# Patient Record
Sex: FEMALE | Race: WHITE | Employment: OTHER | URBAN - METROPOLITAN AREA
[De-identification: names, ages, dates, MRNs, and addresses within clinical notes are randomized per-mention and may not be internally consistent; named-entity substitution may affect disease eponyms.]

---

## 2020-01-24 NOTE — H&P (VIEW-ONLY)
[facility] , [address] [facility], [address] [facility] 
[phone] Patient Name:  Shirlene Smith YOB: 1933 Office Visit 1/24/2020 CHIEF COMPLAINT:   
Chief Complaint Patient presents with  Cough  Hemoptysis  Shortness of Breath  COPD HISTORY OF PRESENT ILLNESS:   
I had the pleasure of seeing Ms. Luis Fernando Jaimes in our clinic today. Ms. Alba Henderson is a 80 y.o. female who presents with a history of colon cancer treated with chemo, surgery. Developed pna during chemo and was hospitalized in 2010. Discharged with O2 at that time and followed up in our office. Was on O2 for 3 montths. Restarted using O2 around Christmas (using her 's who has it for COPD). Since December she has had increased chest congestion. Started hemoptysis about 2-3 months ago. Every time she coughs it all blood, mix of old and new. Staying stable over the past 2-3 months. Had CXR at [facility] last Friday. Told has cavitary lesion in the upper part of her right lung. Denies any fever, chills, night sweats. Coughing a lot at night. Lost 8 pounds over last 9 month. No chest pains. No noticeable LAD. Does occasional have wheezing and chest tightness and uses nebulizer with albuterol. Former smoker: quit 1995. 1/2 ppd x 40 years. Cancer: colon cancer stage 2, dx 2009. Disease free since then. Activity limited by knee pain. Can hardly walk due to this. Uses a walker when she does. Past Medical History:  
Diagnosis Date  Arthritis   
 bilat knees  Cancer ([facility] [address] COLON- dx- 5/2009- surg and chemo-- last chemo 1/10  
 COPD   
 3/10 per pt states pulmonary m.d. said it was r/t chemo Problem List  Date Reviewed: 1/24/2020 Codes Class Noted Colon cancer [facility]) ICD-10-CM: C18.9 ICD-9-CM: 153.9 Chronic 2/18/2010  Overview Signed 2/18/2010 12:27 PM by Patti Arias  
 2-18-10  S/P last adjuvant chemotherapy with FOLFOX 2 weeks ago. Pneumonia ICD-10-CM: J18.9 ICD-9-CM: 891 Acute 2010 ILD (interstitial lung disease) (HCC) (Chronic) ICD-10-CM: J84.9 ICD-9-CM: 901  2010 Dyspnea (Chronic) ICD-10-CM: R06.00 
ICD-9-CM: 786.09  2010 Cancer [facility]) ICD-10-CM: C80.1 ICD-9-CM: 199.1  2009 Past Surgical History:  
Procedure Laterality Date  ABDOMEN SURGERY PROC UNLISTED    
 colon resection  HX BREAST LUMPECTOMY    
 RIGHT AND LEFT  
 HX HYSTERECTOMY  HX KNEE ARTHROSCOPY    
 RIGHT  HX VASCULAR ACCESS  VASCULAR SURGERY PROCEDURE UNLIST    
 VARICOSE VEIN STRIPPING No flowsheet data found. Social History Socioeconomic History  Marital status:  Spouse name: Not on file  Number of children: Not on file  Years of education: Not on file  Highest education level: Not on file Occupational History  Not on file Social Needs  Financial resource strain: Not on file  Food insecurity:  
  Worry: Not on file Inability: Not on file  Transportation needs:  
  Medical: Not on file Non-medical: Not on file Tobacco Use  Smoking status: Former Smoker Packs/day: 0.20 Years: 40.00 Pack years: 8.00 Types: Cigarettes Last attempt to quit:  Years since quittin.0  Smokeless tobacco: Never Used Substance and Sexual Activity  Alcohol use: No  
 Drug use: No  
 Sexual activity: Not on file Lifestyle  Physical activity:  
  Days per week: Not on file Minutes per session: Not on file  Stress: Not on file Relationships  Social connections:  
  Talks on phone: Not on file Gets together: Not on file Attends [employer] service: Not on file Active member of club or organization: Not on file Attends meetings of clubs or organizations: Not on file Relationship status: Not on file  Intimate partner violence:  
  Fear of current or ex partner: Not on file Emotionally abused: Not on file Physically abused: Not on file Forced sexual activity: Not on file Other Topics Concern  Not on file Social History Narrative  Not on file Family History Problem Relation Age of Onset  Cancer Father  Cancer Sister Allergies Allergen Reactions  Pcn [Penicillins] Rash, Itching and Swelling  Tetanus Vaccines And Toxoid Unknown (comments) Unknown allergy  Levaquin [Levofloxacin] Itching Itching at site of infusion only at start of infusion, pt states very uncomfortable Current Outpatient Medications Medication Sig  
 albuterol-ipratropium (DUO-NEB) 2.5 mg-0.5 mg/3 ml nebu Take 3 mL by inhalation every six (6) hours as needed for Wheezing.  Oxygen 2.5-3 lpm cont  umeclidinium-vilanterol (ANORO ELLIPTA) 62.5-25 mcg/actuation inhaler Take 1 Puff by inhalation daily.  HYDROCODONE BIT/ACETAMINOPHEN (LORTAB 10/500 PO) take  by mouth as needed.  CALCIUM CARBONATE/VITAMIN D3 (CALTRATE-600 PLUS VITAMIN D3 PO) Take  by mouth two (2) times a day.  FERROUS SULFATE PO Take 5 g by mouth nightly.  naproxen (NAPROSYN) 500 mg tablet Take 500 mg by mouth as needed. Stopped 8/19/10  promethazine (PHENERGAN) 25 mg tablet Take 25 mg by mouth every six (6) hours as needed for Nausea.  ACETAMINOPHEN/DP-HYDRAM HCL (TYLENOL PM EXTRA STRENGTH PO) take 2 Tabs by mouth nightly. No current facility-administered medications for this visit. REVIEW OF SYSTEMS: 
 
Review of Systems Constitutional: Negative for chills, diaphoresis, fever, malaise/fatigue and weight loss. HENT: Positive for congestion. Negative for ear discharge, ear pain, hearing loss, nosebleeds, sinus pain, sore throat and tinnitus. Eyes: Negative for blurred vision, pain and redness.   
Respiratory: Positive for cough, hemoptysis, shortness of breath and wheezing. Negative for sputum production. Cardiovascular: Negative for chest pain, palpitations, orthopnea, leg swelling and PND. Gastrointestinal: Negative for abdominal pain, blood in stool, constipation, diarrhea, heartburn, melena, nausea and vomiting. Genitourinary: Negative for dysuria, frequency, hematuria and urgency. Musculoskeletal: Negative for back pain, joint pain, myalgias and neck pain. Skin: Negative for itching and rash. Neurological: Negative for dizziness, tremors, focal weakness, seizures and headaches. Endo/Heme/Allergies: Negative for environmental allergies. Does not bruise/bleed easily. Psychiatric/Behavioral: Negative for depression, hallucinations, memory loss and suicidal ideas. The patient is not nervous/anxious. PHYSICAL EXAM: 
Visit Vitals /80 (BP 1 Location: Left arm, BP Patient Position: Sitting) Pulse 99 Temp 98 °F (36.7 °C) (Temporal) Resp 20 Ht 5' 7\" (1.702 m) Wt 213 lb (96.6 kg) SpO2 97% BMI 33.36 kg/m² GENERAL APPEARANCE: 
The patient is over weight and in no respiratory distress. HEENT: 
PERRL. Conjunctivae unremarkable. Nasal mucosa is without epistaxis, exudate, or polyps. Gums and dentition are unremarkable. There is no oropharyngeal narrowing. NECK/LYMPHATIC: 
Symmetrical with no elevation of jugular venous pulsation. Trachea midline. No thyroid enlargement. No cervical adenopathy. LUNGS: 
Normal respiratory effort with symmetrical lung expansion. Breath sounds right-sided inspiratory rhonchi. HEART: 
There is a regular rate and rhythm. No murmur, rub, or gallop. ABDOMEN: 
Soft and non-tender. No hepatosplenomegaly. Bowel sounds are normal.   
NEURO/PSYCH: 
The patient is alert and oriented to person, place, and time. Memory appears intact and mood is normal.  No gross sensorimotor deficits are present. MS/SKIN: 
There is no edema in the lower extremities. No rashes, bruises, lesions, ulcers visible. DIAGNOSTIC TESTS: 
CT of chest:   
Results from Hospital Encounter encounter on 11/15/10 CT CHEST WITH CONTRAST Results for orders placed during the hospital encounter of 04/06/10 CT CHEST WITHOUT CONTRAST Narrative [address] [facility] COMPUTED TOMOGRAPHY              
 
NAME: Marcela Fiore                                                              
PT : 1933               SEX: F         MR#: [id number] LOCATION/NS: CT-                 AGE: 19P      ACCT: [de-identified] ORDERING: MARKO Tomlinson                     PT TYPE: Munir Flood ([id number]) Final Report ICD Codes / Adm. Diagnosis:    /    
Examination:  CT CHEST WO CONTRAST  - [id number] - 2010  1:06PM 
 
CT CHEST WITHOUT CONTRAST 04/06/10 Reason:  Interstitial lung disease. REPORT:   
 
TECHNIQUE:  Multiple contiguous helical CT images were obtained from the  
base of the neck through the mid abdomen without the administration of  
intravenous contrast.  Subsequent noncontiguous high-resolution,  
thin-section axial images were obtained with the patient in the prone and  
supine position. imaging was performed according to the institutional  
high-resolution CT protocol. COMPARISON:  CT of the chest with contrast 02/16/10. FINDINGS:  The base of the neck is unremarkable in appearance. A port is  
seen in the right upper anterior chest wall soft tissues. A single catheter  
is seen extending from this with the tip at the cavoatrial junction. No  
supraclavicular, axillary, or mediastinal lymphadenopathy is seen. No  
definite hilar lymphadenopathy is seen although evaluation is limited due to  
noncontrast technique. The thoracic aorta demonstrates moderate atherosclerotic calcification although remains normal in caliber. There are  
centrilobular emphysematous changes seen in the upper lobes predominantly. There are coarse reticular opacities seen peripherally in the lung bases. These do not improve with prone positioning and therefore are consistent  
with relatively mild fibrotic changes at these levels. In addition, there  
is mild cylindrical bronchiectatic changes seen in the lower lobes. No  
abnormal ground-glass opacities are seen. No aggressive lytic, or sclerotic  
osseous abnormality is seen. Limited imaging of the upper abdomen shows no  
significant abnormalities. There is an 11 mm rounded soft tissue density  
seen adjacent to the spleen likely representing a small splenule. IMPRESSION:  RELATIVELY MILD FIBROTIC CHANGES, AND CYLINDRICAL BRONCHIECTATIC CHANGES SEEN IN THE LOWER LOBES. OTHERWISE, CENTRILOBULAR  
EMPHYSEMATOUS CHANGES ARE MOST PROMINENTLY SEEN INVOLVING THE UPPER LOBES. Interpreting/Reading Doctor: Christ Dior ([id number]) Transcribed: TLS on 2010 Delia Victor ([id number])  2010 Distribution:  Attending Doctor: Tatyana Cervantes Results for orders placed during the hospital encounter of 11/15/10 CT CHEST WITH CONTRAST Narrative [address] [facility] COMPUTED TOMOGRAPHY              
 
NAME: Linn Leyden                                                              
PT : 1933               SEX: F         MR#: [id number] LOCATION/NS: CT-                 AGE: 59K      ACCT: [de-identified] ORDERING: MARKO Cervantes                     PT TYPE: Yelena Victor ([id number]) Final Report ICD Codes / Adm. Diagnosis:    /    
 Examination:  CT CHEST W CONTRAST  - [id number] - Nov 15 2010 12:37PM 
 
Reason:  adenopathy REPORT: 
CT CHEST WITH CONTRAST DATED 11/15/2010. History: Followup adenopathy. Comparison: CT chest without contrast 4/6/2010, and 2/16/2010. Technique:   Multiple contiguous helical CT images reconstructed at 5 mm  
were obtained from the neck base to the mid abdomen following the  
administration of 100 cc of intravenous contrast without acute complication. Findings: 
The venous port present on the prior study has been removed. The base of the  
neck is unremarkable in appearance. No axillary, mediastinal, or hilar  
lymphadenopathy is seen. The thoracic aorta is normal in caliber. No  
obvious filling defects are seen in the central pulmonary arteries, although  
these are not well evaluated due to non-angiographic technique. Moderate  
atherosclerotic calcification is seen of the thoracic aorta and coronary  
arteries. Evaluation with lung windows demonstrates a 4 mm noncalcified nodule seen in  
the right upper lobe on image 12. In retrospect, this was present and stable  
when compared to the prior study. The previous dependent atelectatic changes  
are improved. No  pleural effusion is seen. Lungs are expanded without  
evidence for pneumothorax. Limited evaluation of the upper abdomen demonstrates no significant  
abnormality. The spleen is heterogeneous in its appearance likely related to  
the early arterial phase of imaging. IMPRESSION:   
1. No axillary, mediastinal, or hilar lymphadenopathy. The lymphadenopathy  
noted on 2/16/2010 study was likely benign and reactive to pulmonary  
findings seen at that time. 2. 4 mm noncalcified nodule seen in the right upper lobe which is stable  
dating back to the 2/16/2010 study. Recommend a followup noncontrast CT scan  
of the chest in 6 months to ensure stability of this finding. Interpreting/Reading Doctor: Giancarlo Adames ([id number]) Transcribed: n/a on 11/15/2010 Manuel Shepard ([id number])  11/15/2010 Distribution:  Attending Doctor: Mary West. Ana Luisa Wright No results found for this or any previous visit. CXR:  01/17/2020 Only received lateral images Cardiomediastinal contours within normal limits.  Atherosclerosis of the aortic arch. There is a cavitary mass in the right upper lobe.  Mild streaky opacities in the lung bases which may represent atelectasis. No pleural effusion or pneumothorax. Multilevel degenerative changes of the spine. Results for orders placed during the hospital encounter of 04/01/10 XR CHEST PA AND LATERAL  
 
 
 
PET/CT:  
No results found for this or any previous visit. No results found for this or any previous visit. Exercise oximetry:   
1/24/2020 Resting RA Sat - 96% Lowest ambulating RA Sat - 95% Spirometry:  
Date:    1/24/20 FVC    1.73-67 FEV1    1.13-67 FEV1/FVC    65 FEF 25-75%    0.62-57 Bronchodilator Response TLC           
RV           
DLCO Echo: 
No results found for this or any previous visit. ASSESSMENT:  (Medical Decision Making) ICD-10-CM ICD-9-CM 1. Cavitary lesion of lung J98.4 518.89 CT CHEST WO CONT 2. SOB (shortness of breath) R06.02 786.05   
3. AVALOS (dyspnea on exertion) R06.09 786.09 AMB POC PULSE OXIMETRY, MULTIPLE  
   AMB POC SPIROMETRY 4. Hemoptysis R04.2 786.30   
5. Pulmonary fibrosis ([facility] [address] J84.10 515 Patient with history of former tobacco abuse and resultant moderate obstruction on spirometry reporting several months of hemoptysis. Outside chest x-ray report mentions right upper lobe cavitary mass. No TB risk factors. Concerning for malignancy. Did not desaturate on today's spirometry. PLAN: 
-will schedule CT scan to evaluate cavitary lesion further. May need bronchoscopy. -will start anoro for her COPD. -does not qualify for her own supplemental O2.  
-asking for something for cough/to help her sleep at night. Tried tessalon without help. Tried phenergan and worked well. Told her this would not be indicated for cough. Can try OTC benadryl for sleep. She has done this with incomplete relief. Will give 5 days of hycodan. I have reviewed the patients controlled substance prescription history, as maintained in the [address] [place] [address] prescription monitoring program, so that the prescription(s) for a  controlled substance can be given. For now plan to f/u in 3 months. Portions of this note were created using voice recognition software. As such, error of speech recognition may have occurred. Orders Placed This Encounter  AMB POC SPIROMETRY  CT CHEST WO CONT Standing Status:   Future Standing Expiration Date:   2/24/2021 Order Specific Question:   Reason for Exam  
  Answer:   Super D Protocol  AMB POC PULSE OXIMETRY, MULTIPLE See result in progress note  umeclidinium-vilanterol (ANORO ELLIPTA) 62.5-25 mcg/actuation inhaler Sig: Take 1 Puff by inhalation daily. Dispense:  1 Inhaler Refill:  11 Gerard Benoit MD 
Electronically signed

## 2020-01-25 ENCOUNTER — HOSPITAL ENCOUNTER (OUTPATIENT)
Dept: CT IMAGING | Age: 85
Discharge: HOME | End: 2020-01-25
Attending: INTERNAL MEDICINE
Payer: MEDICARE

## 2020-01-25 DIAGNOSIS — J98.4 CAVITARY LESION OF LUNG: ICD-10-CM

## 2020-01-25 PROCEDURE — 71250 CT THORAX DX C-: CPT

## 2020-02-18 ENCOUNTER — ANESTHESIA EVENT (OUTPATIENT)
Dept: ENDOSCOPY | Age: 85
End: 2020-02-18
Payer: MEDICARE

## 2020-02-18 ENCOUNTER — HOSPITAL ENCOUNTER (OUTPATIENT)
Dept: PET IMAGING | Age: 85
Discharge: HOME | End: 2020-02-18
Payer: MEDICARE

## 2020-02-18 DIAGNOSIS — R91.1 PULMONARY NODULE: ICD-10-CM

## 2020-02-18 DIAGNOSIS — J98.4 CAVITATING MASS IN UPPER LOBE OF LUNG: ICD-10-CM

## 2020-02-18 PROCEDURE — 74011636320 HC RX REV CODE- 636/320: Performed by: INTERNAL MEDICINE

## 2020-02-18 PROCEDURE — A9552 F18 FDG: HCPCS

## 2020-02-18 RX ORDER — SODIUM CHLORIDE 0.9 % (FLUSH) 0.9 %
10 SYRINGE (ML) INJECTION
Status: COMPLETED | OUTPATIENT
Start: 2020-02-18 | End: 2020-02-18

## 2020-02-18 RX ADMIN — Medication 10 ML: at 12:38

## 2020-02-18 RX ADMIN — DIATRIZOATE MEGLUMINE AND DIATRIZOATE SODIUM 10 ML: 660; 100 LIQUID ORAL; RECTAL at 12:38

## 2020-02-19 ENCOUNTER — APPOINTMENT (OUTPATIENT)
Dept: GENERAL RADIOLOGY | Age: 85
End: 2020-02-19
Attending: INTERNAL MEDICINE
Payer: MEDICARE

## 2020-02-19 ENCOUNTER — HOSPITAL ENCOUNTER (OUTPATIENT)
Age: 85
Setting detail: OUTPATIENT SURGERY
Discharge: HOME | End: 2020-02-19
Attending: INTERNAL MEDICINE | Admitting: INTERNAL MEDICINE
Payer: MEDICARE

## 2020-02-19 ENCOUNTER — ANESTHESIA (OUTPATIENT)
Dept: ENDOSCOPY | Age: 85
End: 2020-02-19
Payer: MEDICARE

## 2020-02-19 VITALS
TEMPERATURE: 98.2 F | RESPIRATION RATE: 18 BRPM | OXYGEN SATURATION: 97 % | HEART RATE: 81 BPM | WEIGHT: 213 LBS | DIASTOLIC BLOOD PRESSURE: 76 MMHG | SYSTOLIC BLOOD PRESSURE: 150 MMHG | BODY MASS INDEX: 33.43 KG/M2 | HEIGHT: 67 IN

## 2020-02-19 DIAGNOSIS — C34.81 MALIGNANT NEOPLASM OF OVERLAPPING SITES OF RIGHT LUNG (HCC): ICD-10-CM

## 2020-02-19 PROCEDURE — 88173 CYTOPATH EVAL FNA REPORT: CPT

## 2020-02-19 PROCEDURE — 77030012699 HC VLV SUC CNTRL OCOA -A: Performed by: INTERNAL MEDICINE

## 2020-02-19 PROCEDURE — 31652 BRONCH EBUS SAMPLNG 1/2 NODE: CPT | Performed by: INTERNAL MEDICINE

## 2020-02-19 PROCEDURE — 77030039425 HC BLD LARYNG TRULITE DISP TELE -A: Performed by: ANESTHESIOLOGY

## 2020-02-19 PROCEDURE — 74011000250 HC RX REV CODE- 250: Performed by: REGISTERED NURSE

## 2020-02-19 PROCEDURE — 88112 CYTOPATH CELL ENHANCE TECH: CPT

## 2020-02-19 PROCEDURE — 76060000033 HC ANESTHESIA 1 TO 1.5 HR: Performed by: INTERNAL MEDICINE

## 2020-02-19 PROCEDURE — 88305 TISSUE EXAM BY PATHOLOGIST: CPT

## 2020-02-19 PROCEDURE — 77030037088 HC TUBE ENDOTRACH ORAL NSL COVD-A: Performed by: ANESTHESIOLOGY

## 2020-02-19 PROCEDURE — 31654 BRONCH EBUS IVNTJ PERPH LES: CPT | Performed by: INTERNAL MEDICINE

## 2020-02-19 PROCEDURE — 31628 BRONCHOSCOPY/LUNG BX EACH: CPT | Performed by: INTERNAL MEDICINE

## 2020-02-19 PROCEDURE — 77030028571 HC CATH ENDOBRNCH DISP BIOP KT SPMD -G1: Performed by: INTERNAL MEDICINE

## 2020-02-19 PROCEDURE — 77030003406 HC NDL ASPIR BIOP OCOA -C: Performed by: INTERNAL MEDICINE

## 2020-02-19 PROCEDURE — 88172 CYTP DX EVAL FNA 1ST EA SITE: CPT

## 2020-02-19 PROCEDURE — 88341 IMHCHEM/IMCYTCHM EA ADD ANTB: CPT

## 2020-02-19 PROCEDURE — 74011250636 HC RX REV CODE- 250/636: Performed by: REGISTERED NURSE

## 2020-02-19 PROCEDURE — 88177 CYTP FNA EVAL EA ADDL: CPT

## 2020-02-19 PROCEDURE — 31632 BRONCHOSCOPY/LUNG BX ADDL: CPT | Performed by: INTERNAL MEDICINE

## 2020-02-19 PROCEDURE — 77030009046 HC CATH BRNCH BLLN OCOA -B: Performed by: INTERNAL MEDICINE

## 2020-02-19 PROCEDURE — 76040000027: Performed by: INTERNAL MEDICINE

## 2020-02-19 PROCEDURE — 77030031420 HC NDL TIP BRSH ASP SD SPDM -B: Performed by: INTERNAL MEDICINE

## 2020-02-19 PROCEDURE — 88342 IMHCHEM/IMCYTCHM 1ST ANTB: CPT

## 2020-02-19 PROCEDURE — 31627 NAVIGATIONAL BRONCHOSCOPY: CPT | Performed by: INTERNAL MEDICINE

## 2020-02-19 PROCEDURE — 77030021922 HC FCPS BIOP SD DISP SPDM -D: Performed by: INTERNAL MEDICINE

## 2020-02-19 PROCEDURE — 74011250636 HC RX REV CODE- 250/636: Performed by: ANESTHESIOLOGY

## 2020-02-19 PROCEDURE — 77030031404 HC PTCH SENS SD DISP SPDM -A: Performed by: INTERNAL MEDICINE

## 2020-02-19 RX ORDER — SUCCINYLCHOLINE CHLORIDE 20 MG/ML
INJECTION INTRAMUSCULAR; INTRAVENOUS AS NEEDED
Status: DISCONTINUED | OUTPATIENT
Start: 2020-02-19 | End: 2020-02-19 | Stop reason: HOSPADM

## 2020-02-19 RX ORDER — SODIUM CHLORIDE 0.9 % (FLUSH) 0.9 %
5-40 SYRINGE (ML) INJECTION AS NEEDED
Status: DISCONTINUED | OUTPATIENT
Start: 2020-02-19 | End: 2020-02-19 | Stop reason: HOSPADM

## 2020-02-19 RX ORDER — LIDOCAINE HYDROCHLORIDE 20 MG/ML
INJECTION, SOLUTION EPIDURAL; INFILTRATION; INTRACAUDAL; PERINEURAL AS NEEDED
Status: DISCONTINUED | OUTPATIENT
Start: 2020-02-19 | End: 2020-02-19 | Stop reason: HOSPADM

## 2020-02-19 RX ORDER — SODIUM CHLORIDE 0.9 % (FLUSH) 0.9 %
5-40 SYRINGE (ML) INJECTION EVERY 8 HOURS
Status: DISCONTINUED | OUTPATIENT
Start: 2020-02-19 | End: 2020-02-19 | Stop reason: HOSPADM

## 2020-02-19 RX ORDER — HYDROMORPHONE HYDROCHLORIDE 1 MG/ML
0.5 INJECTION, SOLUTION INTRAMUSCULAR; INTRAVENOUS; SUBCUTANEOUS
Status: DISCONTINUED | OUTPATIENT
Start: 2020-02-19 | End: 2020-02-19 | Stop reason: HOSPADM

## 2020-02-19 RX ORDER — SODIUM CHLORIDE 9 MG/ML
25 INJECTION, SOLUTION INTRAVENOUS CONTINUOUS
Status: DISCONTINUED | OUTPATIENT
Start: 2020-02-19 | End: 2020-02-19 | Stop reason: HOSPADM

## 2020-02-19 RX ORDER — ONDANSETRON 2 MG/ML
INJECTION INTRAMUSCULAR; INTRAVENOUS AS NEEDED
Status: DISCONTINUED | OUTPATIENT
Start: 2020-02-19 | End: 2020-02-19 | Stop reason: HOSPADM

## 2020-02-19 RX ORDER — SODIUM CHLORIDE, SODIUM LACTATE, POTASSIUM CHLORIDE, CALCIUM CHLORIDE 600; 310; 30; 20 MG/100ML; MG/100ML; MG/100ML; MG/100ML
100 INJECTION, SOLUTION INTRAVENOUS CONTINUOUS
Status: DISCONTINUED | OUTPATIENT
Start: 2020-02-19 | End: 2020-02-19 | Stop reason: HOSPADM

## 2020-02-19 RX ORDER — ROCURONIUM BROMIDE 10 MG/ML
INJECTION, SOLUTION INTRAVENOUS AS NEEDED
Status: DISCONTINUED | OUTPATIENT
Start: 2020-02-19 | End: 2020-02-19 | Stop reason: HOSPADM

## 2020-02-19 RX ORDER — FENTANYL CITRATE 50 UG/ML
INJECTION, SOLUTION INTRAMUSCULAR; INTRAVENOUS AS NEEDED
Status: DISCONTINUED | OUTPATIENT
Start: 2020-02-19 | End: 2020-02-19 | Stop reason: HOSPADM

## 2020-02-19 RX ORDER — OXYCODONE HYDROCHLORIDE 5 MG/1
5 TABLET ORAL
Status: DISCONTINUED | OUTPATIENT
Start: 2020-02-19 | End: 2020-02-19 | Stop reason: HOSPADM

## 2020-02-19 RX ORDER — HYDROCODONE BITARTRATE AND ACETAMINOPHEN 7.5; 325 MG/1; MG/1
1 TABLET ORAL AS NEEDED
Status: DISCONTINUED | OUTPATIENT
Start: 2020-02-19 | End: 2020-02-19 | Stop reason: HOSPADM

## 2020-02-19 RX ORDER — FENTANYL CITRATE 50 UG/ML
100 INJECTION, SOLUTION INTRAMUSCULAR; INTRAVENOUS ONCE
Status: DISCONTINUED | OUTPATIENT
Start: 2020-02-19 | End: 2020-02-19 | Stop reason: HOSPADM

## 2020-02-19 RX ORDER — MIDAZOLAM HYDROCHLORIDE 1 MG/ML
2 INJECTION, SOLUTION INTRAMUSCULAR; INTRAVENOUS
Status: DISCONTINUED | OUTPATIENT
Start: 2020-02-19 | End: 2020-02-19 | Stop reason: HOSPADM

## 2020-02-19 RX ORDER — DEXAMETHASONE SODIUM PHOSPHATE 4 MG/ML
INJECTION, SOLUTION INTRA-ARTICULAR; INTRALESIONAL; INTRAMUSCULAR; INTRAVENOUS; SOFT TISSUE AS NEEDED
Status: DISCONTINUED | OUTPATIENT
Start: 2020-02-19 | End: 2020-02-19 | Stop reason: HOSPADM

## 2020-02-19 RX ORDER — LIDOCAINE HYDROCHLORIDE 10 MG/ML
0.1 INJECTION INFILTRATION; PERINEURAL AS NEEDED
Status: DISCONTINUED | OUTPATIENT
Start: 2020-02-19 | End: 2020-02-19 | Stop reason: HOSPADM

## 2020-02-19 RX ORDER — PROPOFOL 10 MG/ML
INJECTION, EMULSION INTRAVENOUS AS NEEDED
Status: DISCONTINUED | OUTPATIENT
Start: 2020-02-19 | End: 2020-02-19 | Stop reason: HOSPADM

## 2020-02-19 RX ORDER — FAMOTIDINE 20 MG/1
20 TABLET, FILM COATED ORAL ONCE
Status: DISCONTINUED | OUTPATIENT
Start: 2020-02-19 | End: 2020-02-19 | Stop reason: HOSPADM

## 2020-02-19 RX ORDER — NALOXONE HYDROCHLORIDE 0.4 MG/ML
0.1 INJECTION, SOLUTION INTRAMUSCULAR; INTRAVENOUS; SUBCUTANEOUS AS NEEDED
Status: DISCONTINUED | OUTPATIENT
Start: 2020-02-19 | End: 2020-02-19 | Stop reason: HOSPADM

## 2020-02-19 RX ADMIN — FENTANYL CITRATE 25 MCG: 50 INJECTION INTRAMUSCULAR; INTRAVENOUS at 10:39

## 2020-02-19 RX ADMIN — ROCURONIUM BROMIDE 5 MG: 10 INJECTION, SOLUTION INTRAVENOUS at 10:02

## 2020-02-19 RX ADMIN — FENTANYL CITRATE 25 MCG: 50 INJECTION INTRAMUSCULAR; INTRAVENOUS at 10:16

## 2020-02-19 RX ADMIN — ONDANSETRON 4 MG: 2 INJECTION INTRAMUSCULAR; INTRAVENOUS at 10:17

## 2020-02-19 RX ADMIN — ROCURONIUM BROMIDE 25 MG: 10 INJECTION, SOLUTION INTRAVENOUS at 10:13

## 2020-02-19 RX ADMIN — FENTANYL CITRATE 25 MCG: 50 INJECTION INTRAMUSCULAR; INTRAVENOUS at 10:47

## 2020-02-19 RX ADMIN — SUCCINYLCHOLINE CHLORIDE 120 MG: 20 INJECTION, SOLUTION INTRAMUSCULAR; INTRAVENOUS at 10:02

## 2020-02-19 RX ADMIN — SODIUM CHLORIDE, SODIUM LACTATE, POTASSIUM CHLORIDE, AND CALCIUM CHLORIDE 100 ML/HR: 600; 310; 30; 20 INJECTION, SOLUTION INTRAVENOUS at 09:20

## 2020-02-19 RX ADMIN — LIDOCAINE HYDROCHLORIDE 60 MG: 20 INJECTION, SOLUTION EPIDURAL; INFILTRATION; INTRACAUDAL; PERINEURAL at 10:02

## 2020-02-19 RX ADMIN — DEXAMETHASONE SODIUM PHOSPHATE 10 MG: 4 INJECTION, SOLUTION INTRAMUSCULAR; INTRAVENOUS at 10:17

## 2020-02-19 RX ADMIN — FENTANYL CITRATE 25 MCG: 50 INJECTION INTRAMUSCULAR; INTRAVENOUS at 10:59

## 2020-02-19 RX ADMIN — PROPOFOL 200 MG: 10 INJECTION, EMULSION INTRAVENOUS at 10:02

## 2020-02-19 NOTE — DISCHARGE INSTRUCTIONS
RESPIRATORY CARE - BRONCHOSCOPY - DISCHARGE INSTRUCTIONS    You received a lot of numbing medication for your throat and nose, and you also received medication to make you sleepy during your procedure. Because of this and because the bronchoscopy may have irritated your airways, we ask that you follow these directions:    1. Do not eat or drink until  1200pm .   After that, you may have what you please. You may want to try some liquids first, because your throat may be a little sore. 2. Medication may cause drowsiness for several hours, therefore:  · Do not drive or operate machinery for remainder of the day. · No alcohol today  · Do not make any important or legal decisions for 24 hours  · Do not sign any legal documents for 24 hours    3. You may cough up more mucus than usual and you may see some blood, but this is expected and should subside by the following day. 4. If severe throat irritation, coughing, or bleeding continue, call your doctor. 5.         If you run a fever greater than 102, call [facility] at [phone]. 6.         Dr. Andrea Sanz has asked that you:                A. Call the doctor's office for any questions or concerns regarding today's procedure. B. The office will call you to schedule follow up appointments and additional procedures/tests needed. Discharge Medications:  Resume all normal medications taking caution with pain and sedation medications.           Instructions given to Airam Snow and other family members    After general anesthesia or intravenous sedation, for 24 hours or while taking prescription Narcotics:  · Limit your activities  · A responsible adult needs to be with you for the next 24 hours  · Do not drive and operate hazardous machinery  · Do not make important personal or business decisions  · Do  not drink alcoholic beverages  · If you have not urinated within 8 hours after discharge, please contact your surgeon on call. · If you have sleep apnea and have a CPAP machine, please use it for all naps and sleeping. · Please use caution when taking narcotics and any of your home medications that may cause drowsiness. *  Please give a list of your current medications to your Primary Care Provider. *  Please update this list whenever your medications are discontinued, doses are      changed, or new medications (including over-the-counter products) are added. *  Please carry medication information at all times in case of emergency situations. These are general instructions for a healthy lifestyle:  No smoking/ No tobacco products/ Avoid exposure to second hand smoke  Surgeon General's Warning:  Quitting smoking now greatly reduces serious risk to your health. Obesity, smoking, and sedentary lifestyle greatly increases your risk for illness  A healthy diet, regular physical exercise & weight monitoring are important for maintaining a healthy lifestyle    You may be retaining fluid if you have a history of heart failure or if you experience any of the following symptoms:  Weight gain of 3 pounds or more overnight or 5 pounds in a week, increased swelling in our hands or feet or shortness of breath while lying flat in bed. Please call your doctor as soon as you notice any of these symptoms; do not wait until your next office visit.

## 2020-02-19 NOTE — INTERVAL H&P NOTE
H&P Update:  Arti Mckinnon was seen and examined. History and physical has been reviewed. The patient has been examined.  There have been no significant clinical changes since the completion of the originally dated History and Physical.

## 2020-02-19 NOTE — PROCEDURES
PROCEDURE  Bronchoscopy with Endobronchial Ultrasound Guided Fine Needle Aspiration Of Medistinal Lymph nodes and airway inspection and EMN aided biopsy of peripheral lung nodule. EQUIPMENT:  Olympus T180 bronchoscope, Super Dimension EMN system, 180 deg steareble sheath, Olympus [id number] EBUS scope, UM 17 Radial probe, Super D forceps, Fluoroscopy. INDICATION   Peripheral nodule/mass suspicious for malignancy/staging of presumed malignancy        POST OP DIAGNOSIS:  Super Dimension EMN system was employed to identify and biopsy the RUL mass and RLL nodule mass seen on CT/PET. 8 forceps biopsies for upper lobe mass and 8 forceps biopsies fron RLL nodule were obtained and evaluated via touch prep and MADI. Touch preps revealed malignancy(NSC) on both targets. Histology from obtained tissue is currently pending. 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 This could obviously also be a T4 RUL(IIIA) cancer with metastases to RLL. PET appearance suggests 2 different behaviours with RUL cavitating and very pet positive and RLL solid and low pet activity- await final path    Needs liver protocol CT  To exclude liver metastasis based on PET report. Will obtain brain MRI and CPFT and discuss in [facility] once all test results are available. ANESTHESIA  GETA    AIRWAY INSPECTION  After obtaining informed consent, using a bite block, an Olympus Q 180 viedeo bronchoscope was  introduced into the trachea through the vocal cords without complication.     RIGHT  LOCATION NORM/ABNORMAL DESCRIPTION   VOCAL CORDS NL    TRACHEA NL    MARIE NL    RMSB NL    RUL NL    BI NL    RML NL    SUP SEGM RLL NL    MED BASAL NL    ANTERIOR BASAL NL    LATERAL BASAL NL    POSTERIOR BASAL NL                  LEFT  LOCATION NORMAL/ABNORMAL TYPE   LMSB NL    JOSEPH NL    LINGULA NL    SUPERIOR DIVISION NL    SUPERIOR SEG LLL NL ANANT-MEDIAL LLL NL    LATERAL LLL NL    POSTERIOR LLL NL                         EBUS  After completing the airway inspection an Olympus  F EBUS bronchoscope was introduced into the trachea through the vocal chords without complication. The balloon was inflated with saline and a mediastinal inspection commenced:      STATION SIZE IN CM   11R sup No tgt   11R inf No tgt   10R No tgt   7 No tgt   4R 1.0/0.9   11L No tgt   10L No tgt   4L No tgt   2L No tgt on PET   2R \"         After identifying targets the following samples were obtained:    STATION PASS# LYMPHOCYTES MALIGNANT ATYPICAL GRANULOMA NONDGNSTC   4R 1 + - - -     2 + - - -     3 + - - -        © 2018 [employer] and/or its affiliates. All Rights Reserved. T, N, and M descriptors for the eighth edition of TNM classification for lung cancer    T: Primary tumor   Tx Primary tumor cannot be assessed or tumor proven by presence of malignant cells in sputum or bronchial washings but not visualized by imaging or bronchoscopy   T0 No evidence of primary tumor   Tis Carcinoma in situ   T1 Tumor ? 3 cm in greatest dimension surrounded by lung or visceral pleura without bronchoscopic evidence of invasion more proximal than the lobar bronchus (ie, not in the main bronchus)*   T1a(mi) Minimally invasive adenocarcinoma¶   T1a Tumor ? 1 cm in greatest dimension*   T1b Tumor >1 cm but ?2 cm in greatest dimension*   T1c Tumor >2 cm but ?3 cm in greatest dimension*   T2 Tumor >3 cm but ?5 cm or tumor with any of the following features:?  · Involves main bronchus regardless of distance from the cherie but without involvement of the cherie  · Invades visceral pleura  · Associated with atelectasis or obstructive pneumonitis that extends to the hilar region, involving part or all of the lung   T2a Tumor >3 cm but ?4 cm in greatest dimension   T2b Tumor >4 cm but ?5 cm in greatest dimension   T3 Tumor >5 cm but ?7 cm in greatest dimension or associated with separate tumor nodule(s) in the same lobe as the primary tumor or directly invades any of the following structures: chest wall (including the parietal pleura and superior sulcus tumors), phrenic nerve, parietal pericardium   T4 Tumor >7 cm in greatest dimension or associated with separate tumor nodule(s) in a different ipsilateral lobe than that of the primary tumor or invades any of the following structures: diaphragm, mediastinum, heart, great vessels, trachea, recurrent laryngeal nerve, esophagus, vertebral body, and cherie   N: Regional lymph node involvement   Nx Regional lymph nodes cannot be assessed   N0 No regional lymph node metastasis   N1 Metastasis in ipsilateral peribronchial and/or ipsilateral hilar lymph nodes and intrapulmonary nodes, including involvement by direct extension   N2 Metastasis in ipsilateral mediastinal and/or subcarinal lymph node(s)   N3 Metastasis in contralateral mediastinal, contralateral hilar, ipsilateral or contralateral scalene, or supraclavicular lymph node(s)   M: Distant metastasis   M0 No distant metastasis   M1 Distant metastasis present   M1a Separate tumor nodule(s) in a contralateral lobe; tumor with pleural or pericardial nodule(s) or malignant pleural or pericardial effusion? M1b Single extrathoracic metastasis§   M1c Multiple extrathoracic metastases in one or more organs   Stage groupings   Occult carcinoma TX N0 M0   Stage 0 Tis N0 M0   Stage IA1 T1a(mi) N0 M0    T1a N0 M0   Stage IA2 T1b N0 M0   Stage IA3 T1c N0 M0   Stage IB T2a N0 M0   Stage IIA T2b N0 M0   Stage IIB T1a to c N1 M0    T2a N1 M0    T2b N1 M0    T3 N0 M0   Stage IIIA T1a to c N2 M0    T2a to b N2 M0    T3 N1 M0    T4 N0 M0    T4 N1 M0   Stage IIIB T1a to c N3 M0    T2a to b N3 M0    T3 N2 M0    T4 N2 M0   Stage IIIC T3 N3 M0    T4 N3 M0   Stage KRUNAL Any T Any N M1a    Any T Any N M1b   Stage IVB Any T Any N M1c   NOTE: Changes to the seventh edition are in bold.     TNM: tumor, node, metastasis; Tis: carcinoma in situ; T1a(mi): minimally invasive adenocarcinoma. * The uncommon superficial spreading tumor of any size with its invasive component limited to the bronchial wall, which may extend proximal to the main bronchus, is also classified as T1a. ¶ Solitary adenocarcinoma, ?3 cm with a predominately lepidic pattern and ?5 mm invasion in any one focus. ? T2 tumors with these features are classified as T2a if ?4 cm in greatest dimension or if size cannot be determined, and T2b if >4 cm but ?5 cm in greatest dimension. ? Most pleural (pericardial) effusions with lung cancer are due to tumor. In a few patients, however, multiple microscopic examinations of pleural (pericardial) fluid are negative for tumor and the fluid is nonbloody and not an exudate. When these elements and clinical judgment dictate that the effusion is not related to the tumor, the effusion should be excluded as a staging descriptor. § This includes involvement of a single distant (nonregional) lymph node. Reproduced from: Rosebud Landau, et al. The IAS[employer] Lung Cancer Staging Project: Proposals for Revision of the TNM Stage Groupings in the Forthcoming (Eighth) Edition of the TNM Classification for Lung Cancer. J Thorac Oncol 2016; 11:39. Table used with the permission of [employer]. All rights reserved. Graphic 887847 Version 1.0                                         Olympus T 180   bronchoscope wasintroduced into the airways to identify and biopsy the RUL mass and RLL nodule with the aid of Super D EMN system and radial probe US. CT image was acquired on with Super D protocol were used and the pathway to target  planned using super D planning software. Planing data was then used to navigate to the nodule, after verification with radial US:    8 forceps biopsies for upper lobe mass and 8 forceps biopsies fron RLL nodule were obtained and evaluated via touch prep and MADI.   Touch preps revealed malignancy(NSC) on both targets. Histology from obtained tissue is currently pending. TOUCH PREP BIOPSY# MALIGNANT SUSPICIOUS ATYPIA NONDGNSTC   RUL mass Forceps 1 - + -     2 ++++ - -     3 ++++ - -     4 -- -- -- In toto for histo    5 -- -- -- \"    6 -- -- -- \"    7 -- -- -- \"    8 -- -- -- \"            1 - - +     2 ++++ - -     3 ++++ - -     4 - + -     5 -- -- -- In toto for histo     6 -- -- -- \"    7 -- -- -- \"    8 -- -- -- \"     EBL: 5 ml    Complications: NONE with no evidence of pneumothorax on post-op flouroscopy.     Shaneka Bearden MD.

## 2020-02-19 NOTE — ANESTHESIA PREPROCEDURE EVALUATION
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            Comments: Lung mass   Neuro/Psych             Comments: Neuropathy and chronic pain Cardiovascular                  Exercise tolerance: <4 METS     GI/Hepatic/Renal  Within defined limits              Endo/Other        Arthritis     Other Findings              Physical Exam    Airway  Mallampati: II  TM Distance: > 6 cm  Neck ROM: normal range of motion   Mouth opening: Normal     Cardiovascular    Rhythm: regular  Rate: normal         Dental    Dentition: Full lower dentures and Full upper dentures     Pulmonary    Rhonchi:bilateral             Abdominal         Other Findings            Anesthetic Plan    ASA: 3  Anesthesia type: general            Anesthetic plan and risks discussed with: Patient

## 2020-02-19 NOTE — ANESTHESIA POSTPROCEDURE EVALUATION
Procedure(s):  BRONCHOSCOPY WITH RADIAL PROBE  ENDOSCOPIC BRONCHOSCOPY ULTRASOUND (EBUS)  BRONCHOSCOPY  BRONCHOSCOPY NAVIGATIONAL W C-ARM  TRANSBRONCHIAL BIOPSY  FINE NEEDLE ASPIRATION. general    Anesthesia Post Evaluation      Multimodal analgesia: multimodal analgesia used between 6 hours prior to anesthesia start to PACU discharge  Patient location during evaluation: PACU  Patient participation: complete - patient participated  Level of consciousness: awake  Pain management: adequate  Airway patency: patent  Anesthetic complications: no  Cardiovascular status: acceptable and hemodynamically stable  Respiratory status: acceptable  Hydration status: acceptable  Comments: Acceptable for discharge from PACU. Post anesthesia nausea and vomiting:  none      Vitals Value Taken Time   /67 2/19/2020 11:47 AM   Temp 36.7 °C (98 °F) 2/19/2020 11:09 AM   Pulse 69 2/19/2020 11:50 AM   Resp 18 2/19/2020 11:33 AM   SpO2 94 % 2/19/2020 11:50 AM   Vitals shown include unvalidated device data.

## 2020-03-11 ENCOUNTER — HOSPITAL ENCOUNTER (OUTPATIENT)
Dept: CT IMAGING | Age: 85
Discharge: HOME | End: 2020-03-11
Attending: INTERNAL MEDICINE
Payer: MEDICARE

## 2020-03-11 DIAGNOSIS — C34.11 MALIGNANT NEOPLASM OF UPPER LOBE OF RIGHT LUNG (HCC): ICD-10-CM

## 2020-03-11 LAB — CREAT BLD-MCNC: 1.1 MG/DL (ref 0.8–1.5)

## 2020-03-11 PROCEDURE — 74011000258 HC RX REV CODE- 258: Performed by: INTERNAL MEDICINE

## 2020-03-11 PROCEDURE — 74160 CT ABDOMEN W/CONTRAST: CPT

## 2020-03-11 PROCEDURE — 82565 ASSAY OF CREATININE: CPT

## 2020-03-11 PROCEDURE — 74011636320 HC RX REV CODE- 636/320: Performed by: INTERNAL MEDICINE

## 2020-03-11 RX ORDER — SODIUM CHLORIDE 0.9 % (FLUSH) 0.9 %
10 SYRINGE (ML) INJECTION
Status: COMPLETED | OUTPATIENT
Start: 2020-03-11 | End: 2020-03-11

## 2020-03-11 RX ADMIN — IOPAMIDOL 100 ML: 755 INJECTION, SOLUTION INTRAVENOUS at 11:47

## 2020-03-11 RX ADMIN — SODIUM CHLORIDE 100 ML: 900 INJECTION, SOLUTION INTRAVENOUS at 11:48

## 2020-03-11 RX ADMIN — DIATRIZOATE MEGLUMINE AND DIATRIZOATE SODIUM 15 ML: 660; 100 LIQUID ORAL; RECTAL at 11:47

## 2020-03-11 RX ADMIN — Medication 10 ML: at 11:47

## 2020-04-01 ENCOUNTER — HOSPITAL ENCOUNTER (OUTPATIENT)
Dept: LAB | Age: 85
Discharge: HOME | End: 2020-04-01
Payer: MEDICARE

## 2020-04-01 ENCOUNTER — PATIENT OUTREACH (OUTPATIENT)
Dept: CASE MANAGEMENT | Age: 85
End: 2020-04-01

## 2020-04-01 DIAGNOSIS — C34.11 MALIGNANT NEOPLASM OF UPPER LOBE OF RIGHT LUNG (HCC): ICD-10-CM

## 2020-04-01 LAB
ALBUMIN SERPL-MCNC: 3.6 G/DL (ref 3.2–4.6)
ALBUMIN/GLOB SERPL: 0.9 {RATIO} (ref 1.2–3.5)
ALP SERPL-CCNC: 92 U/L (ref 50–136)
ALT SERPL-CCNC: 13 U/L (ref 12–65)
ANION GAP SERPL CALC-SCNC: 5 MMOL/L (ref 7–16)
AST SERPL-CCNC: 10 U/L (ref 15–37)
BASOPHILS # BLD: 0 K/UL (ref 0–0.2)
BASOPHILS NFR BLD: 0 % (ref 0–2)
BILIRUB SERPL-MCNC: 0.5 MG/DL (ref 0.2–1.1)
BUN SERPL-MCNC: 15 MG/DL (ref 8–23)
CALCIUM SERPL-MCNC: 9.3 MG/DL (ref 8.3–10.4)
CEA SERPL-MCNC: 2.5 NG/ML (ref 0–3)
CHLORIDE SERPL-SCNC: 107 MMOL/L (ref 98–107)
CO2 SERPL-SCNC: 27 MMOL/L (ref 21–32)
CREAT SERPL-MCNC: 1.18 MG/DL (ref 0.6–1)
DIFFERENTIAL METHOD BLD: NORMAL
EOSINOPHIL # BLD: 0.3 K/UL (ref 0–0.8)
EOSINOPHIL NFR BLD: 3 % (ref 0.5–7.8)
ERYTHROCYTE [DISTWIDTH] IN BLOOD BY AUTOMATED COUNT: 13.6 % (ref 11.9–14.6)
GLOBULIN SER CALC-MCNC: 4 G/DL (ref 2.3–3.5)
GLUCOSE SERPL-MCNC: 123 MG/DL (ref 65–100)
HCT VFR BLD AUTO: 42.9 % (ref 35.8–46.3)
HGB BLD-MCNC: 13.7 G/DL (ref 11.7–15.4)
IMM GRANULOCYTES # BLD AUTO: 0.1 K/UL (ref 0–0.5)
IMM GRANULOCYTES NFR BLD AUTO: 1 % (ref 0–5)
LYMPHOCYTES # BLD: 1.8 K/UL (ref 0.5–4.6)
LYMPHOCYTES NFR BLD: 17 % (ref 13–44)
MCH RBC QN AUTO: 30 PG (ref 26.1–32.9)
MCHC RBC AUTO-ENTMCNC: 31.9 G/DL (ref 31.4–35)
MCV RBC AUTO: 93.9 FL (ref 79.6–97.8)
MONOCYTES # BLD: 0.9 K/UL (ref 0.1–1.3)
MONOCYTES NFR BLD: 8 % (ref 4–12)
NEUTS SEG # BLD: 7.7 K/UL (ref 1.7–8.2)
NEUTS SEG NFR BLD: 71 % (ref 43–78)
NRBC # BLD: 0 K/UL (ref 0–0.2)
PLATELET # BLD AUTO: 220 K/UL (ref 150–450)
PMV BLD AUTO: 10.4 FL (ref 9.4–12.3)
POTASSIUM SERPL-SCNC: 3.9 MMOL/L (ref 3.5–5.1)
PROT SERPL-MCNC: 7.6 G/DL (ref 6.3–8.2)
RBC # BLD AUTO: 4.57 M/UL (ref 4.05–5.25)
SODIUM SERPL-SCNC: 139 MMOL/L (ref 136–145)
WBC # BLD AUTO: 10.8 K/UL (ref 4.3–11.1)

## 2020-04-01 PROCEDURE — 80053 COMPREHEN METABOLIC PANEL: CPT

## 2020-04-01 PROCEDURE — 82378 CARCINOEMBRYONIC ANTIGEN: CPT

## 2020-04-01 PROCEDURE — 85025 COMPLETE CBC W/AUTO DIFF WBC: CPT

## 2020-04-01 PROCEDURE — 36415 COLL VENOUS BLD VENIPUNCTURE: CPT

## 2020-04-01 NOTE — PROGRESS NOTES
Saw patient with Dr Emmy Beach for lung cancer. Pt has previously had treatment for colon cancer with Dr Amelie Mills and is not interested in chemo or surgery. Pt has previously declined an appointment with [facility] but is receptive to pursuing Radiation at this time. Pt does wish to speak with [facility] first. Violet Chavez [facility] spoke with patient today and she has agreed to pursue Radiation. [facility] referral placed and radiation department notified via email. Pt will return to Dr Amelie Mills for any further needs. Navigation will sign off.

## 2020-04-06 ENCOUNTER — HOSPITAL ENCOUNTER (OUTPATIENT)
Dept: RADIATION ONCOLOGY | Age: 85
Discharge: HOME | End: 2020-04-06
Payer: MEDICARE

## 2020-04-06 VITALS
HEART RATE: 128 BPM | RESPIRATION RATE: 16 BRPM | DIASTOLIC BLOOD PRESSURE: 88 MMHG | SYSTOLIC BLOOD PRESSURE: 141 MMHG | TEMPERATURE: 98.3 F | WEIGHT: 220 LBS | BODY MASS INDEX: 34.46 KG/M2 | OXYGEN SATURATION: 96 %

## 2020-04-06 DIAGNOSIS — C34.11 MALIGNANT NEOPLASM OF UPPER LOBE OF RIGHT LUNG (HCC): Primary | ICD-10-CM

## 2020-04-06 PROCEDURE — 77470 SPECIAL RADIATION TREATMENT: CPT

## 2020-04-06 PROCEDURE — 99211 OFF/OP EST MAY X REQ PHY/QHP: CPT

## 2020-04-06 RX ORDER — LORAZEPAM 0.5 MG/1
0.5 TABLET ORAL DAILY
Qty: 8 TAB | Refills: 0 | Status: SHIPPED | OUTPATIENT
Start: 2020-04-06

## 2020-04-06 NOTE — PROGRESS NOTES
Pt here for the initial RT consult for both RUL and RLL cancers of differing pathologies. She has a history of colon cancer for which she received FOLFOX in 2010. Pt stated that she is not interested in chemo or surgery and is interested if RT can cure the lung cancer, if not, she may decide not to have RT either. Per Dr. Meme Connelly, pt's lung cancer is potentially curable with RT. Pt is aware of the CT/Sim appt on 4/9/20. RT consents signed.

## 2020-04-07 NOTE — CONSULTS
[address]    Name:  Ronit Edmond  MR#:  [id number]  :  1933  ACCOUNT #:  [de-identified]  DATE OF SERVICE:  2020    RADIATION ONCOLOGY CONSULTATION    REASON FOR CONSULTATION:  Newly diagnosed lung cancer for assessment for radiation treatment. HISTORY OF PRESENT ILLNESS:  The patient is an 80-year-old white female basically presented with hemoptysis and subsequent chest x-ray on 2020 showed a cavity in the right upper lobe of the lung. CT of the chest on 2020 showed large cavitary upper lobe mass with small air fluid and a second peripheral right lower lobe lesion. PET scan on 2020 which showed two suspicious areas in her right lung. She underwent navigation bronchoscopy on  with some sampling from both of the sites. There was a possible liver lesion noted but this was negative on triple-phase CT. On pathological examination, right upper lobe mass was adenocarcinoma. 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 The patient subsequently was seen by Dr. David Riley, her medical oncologist, for assessment and he subsequently referred her to us for possible SBRT. When I saw the patient, basically she feels well. Denied any significant symptoms. I should mention the patient had diagnosis of colon cancer treated with 10 cycles of FOLFOX which was completed in , and basically she got some degree of neuropathy which currently is being treated with Norco.  She has known COPD. She smoked for 40 years. The patient otherwise stated that she feels reasonably well for her age and denied any significant symptoms. She wanted this treatment to be short and effective or curative. Otherwise, she does not want the treatment.     Past medical history, past surgical history, medications, allergies, family history, social history, hematological studies, and imaging studies all are enclosed in the electronic medical chart and written by our nurses and medical staff. All were reviewed by myself. PAST MEDICAL HISTORY:    Past Medical History:   Diagnosis Date    Arthritis     bilat knees    Cancer ([facility] [address]     COLON- dx- 5/2009- surg and chemo-- last chemo 1/10    Chronic pain     pain in both knees neuropathy in both legs and feet    COPD     3/10 per pt states pulmonary m.d. said it was r/t chemo    Neuropathy     s/p chemo    Supplemental oxygen dependent        The patient denies history of collagen vascular diseases, pacemaker insertion, prior radiation or prior chemotherapy. PAST SURGICAL HISTORY:   Past Surgical History:   Procedure Laterality Date    ABDOMEN SURGERY PROC UNLISTED  2009    colon resection, cancer    HX BREAST LUMPECTOMY      RIGHT AND LEFT    HX CATARACT REMOVAL      with alley iol implants    HX HYSTERECTOMY      HX KNEE ARTHROSCOPY      RIGHT    HX VASCULAR ACCESS      port and removed 2010    VASCULAR SURGERY PROCEDURE UNLIST  age late 19's    VARICOSE VEIN STRIPPING       MEDICATIONS:     Current Outpatient Medications:     LORazepam (ATIVAN) 0.5 mg tablet, Take 1 Tab by mouth daily. Max Daily Amount: 0.5 mg. Take 1 hour prior to procedure daily - do not drive, Disp: 8 Tab, Rfl: 0    promethazine (PHENERGAN) 25 mg tablet, Take 25 mg by mouth every eight (8) hours as needed. , Disp: , Rfl:     HYDROcodone-acetaminophen (NORCO)  mg tablet, Take 1 Tab by mouth every four (4) hours as needed. , Disp: , Rfl:     fluticasone propion-salmeteroL (WIXELA INHUB) 250-50 mcg/dose diskus inhaler, Take 1 Puff by inhalation every twelve (12) hours. , Disp: 1 Inhaler, Rfl: 11    albuterol-ipratropium (DUO-NEB) 2.5 mg-0.5 mg/3 ml nebu, Take 3 mL by inhalation every six (6) hours as needed for Wheezing., Disp: , Rfl:     Oxygen, 2.5-3 lpm cont  Patient use at hs and with moving, Disp: , Rfl:     HYDROCODONE BIT/ACETAMINOPHEN (LORTAB 10/500 PO), take  by mouth as needed. , Disp: , Rfl:   ACETAMINOPHEN/DP-HYDRAM HCL (TYLENOL PM EXTRA STRENGTH PO), take 2 Tabs by mouth nightly., Disp: , Rfl:     CALCIUM CARBONATE/VITAMIN D3 (CALTRATE-600 PLUS VITAMIN D3 PO), Take  by mouth two (2) times a day., Disp: , Rfl:     FERROUS SULFATE PO, Take 5 g by mouth nightly., Disp: , Rfl:     ALLERGIES:   Allergies   Allergen Reactions    Pcn [Penicillins] Rash, Itching and Swelling    Tetanus Vaccines And Toxoid Unknown (comments)     Unknown allergy      Levaquin [Levofloxacin] Itching     Itching at site of infusion only at start of infusion, pt states very uncomfortable       SOCIAL HISTORY:   Social History     Socioeconomic History    Marital status:      Spouse name: Not on file    Number of children: Not on file    Years of education: Not on file    Highest education level: Not on file   Occupational History    Not on file   Social Needs    Financial resource strain: Not on file    Food insecurity     Worry: Not on file     Inability: Not on file   [employer] needs     Medical: Not on file     Non-medical: Not on file   Tobacco Use    Smoking status: Former Smoker     Packs/day: 0.25     Years: 43.00     Pack years: 10.75     Types: Cigarettes     Last attempt to quit:      Years since quittin.2    Smokeless tobacco: Never Used   Substance and Sexual Activity    Alcohol use: No    Drug use: No    Sexual activity: Not on file   Lifestyle    Physical activity     Days per week: Not on file     Minutes per session: Not on file    Stress: Not on file   Relationships    Social connections     Talks on phone: Not on file     Gets together: Not on file     Attends Denominational service: Not on file     Active member of club or organization: Not on file     Attends meetings of clubs or organizations: Not on file     Relationship status: Not on file    Intimate partner violence     Fear of current or ex partner: Not on file     Emotionally abused: Not on file     Physically abused: Not on file     Forced sexual activity: Not on file   Other Topics Concern    Not on file   Social History Narrative    Not on file       FAMILY HISTORY:   Family History   Problem Relation Age of Onset    Cancer Father     Cancer Sister        REVIEW OF SYSTEMS: Please see the completed review of systems sheet in the chart that I have reviewed today. PHYSICAL EXAMINATION:   ECOG Performance status   VITAL SIGNS:   Visit Vitals  /88   Pulse (!) 128   Temp 98.3 °F (36.8 °C)   Resp 16   Wt 99.8 kg (220 lb)   SpO2 96%   BMI 34.46 kg/m²        LABORATORY:   Lab Results   Component Value Date/Time    Sodium 139 04/01/2020 12:40 PM    Potassium 3.9 04/01/2020 12:40 PM    Chloride 107 04/01/2020 12:40 PM    CO2 27 04/01/2020 12:40 PM    Anion gap 5 (L) 04/01/2020 12:40 PM    Glucose 123 (H) 04/01/2020 12:40 PM    BUN 15 04/01/2020 12:40 PM    Creatinine 1.18 (H) 04/01/2020 12:40 PM    GFR est AA 56 (L) 04/01/2020 12:40 PM    GFR est non-AA 46 (L) 04/01/2020 12:40 PM    Calcium 9.3 04/01/2020 12:40 PM    Magnesium 2.0 10/21/2009 08:45 AM    Albumin 3.6 04/01/2020 12:40 PM    Protein, total 7.6 04/01/2020 12:40 PM    Globulin 4.0 (H) 04/01/2020 12:40 PM    A-G Ratio 0.9 (L) 04/01/2020 12:40 PM    AST (SGOT) 10 (L) 04/01/2020 12:40 PM    ALT (SGPT) 13 04/01/2020 12:40 PM     Lab Results   Component Value Date/Time    WBC 10.8 04/01/2020 12:40 PM    HGB 13.7 04/01/2020 12:40 PM    HCT 42.9 04/01/2020 12:40 PM    PLATELET 549 36/82/9608 12:40 PM       RADIOLOGY:    Ct Chest Wo Cont    Addendum Date: 1/30/2020    Addendum: Noncontrast CT of the chest Clinical History: The patient is a 80years year old Female presenting with symptoms of Super D Protocol. Technique:  Thin section axial images were obtained through the chest without intravenous contrast.  All CT scans at this facility are performed using dose reduction/dose modulation techniques, as appropriate the performed exam, including the following: Automated Exposure Control; Adjustment of the mA and/or kV according to patient size (this includes techniques or standardized protocols for targeted exams where dose is matched to indication/reason for exam); and Use of Iterative Reconstruction Technique. Total radiation dose: 687 mGy-cm Comparison:  Chest CT 11/15/2010. Findings: A diagnostic read was requested on 1/30/2020. Images through the lungs demonstrate a cavitary mass with thick rind in the right posterior upper lobe, abutting the pleura, measuring 5.0 x 4.1 cm (series 2 image 47). The wall maximally measures 11 mm with minimal fluid layering in the dependent portion of the lesion. There is a 3.0 x 1.5 cm peripheral right superior lower lobe lesion abutting the pleura (series 2 image 167), with minimal adjacent atelectasis. A tiny 3 mm nodule is seen in the superior left lower lobe segment (series 2 image 88). Emphysematous changes are otherwise seen with minimal marginal bleb formation. Normal vasculature is demonstrated allowing for noncontrast examination. No evidence of aortic aneurysmal dilatation is seen. The heart and mediastinum are grossly unremarkable. There are small nonspecific pretracheal and paratracheal lymph nodes Images chest wall structures as well as visualized portions of the upper abdomen are unremarkable in appearance. There are no acute osseous abnormalities. Impression: 1. Large cavitary right upper lobe mass with small air-fluid level. 2. Secondary peripheral right lower lobe lesion. 3. Tiny superior left lower lobe nodule. 4. Emphysematous lung disease. Result Date: 1/30/2020  A CT Chest WO Contrast was performed. These images are for navigational purposes. These images were not submitted for interpretation. Ct Abd W Cont    Result Date: 3/11/2020  CT of the abdomen with contrast 3/11/2020 Comparison: PET/CT 2/18/2020. Indication: Malignant neoplasm upper lobe primary lung, possible liver metastatic focus on PET/CT. Technique:  A CT was performed of the abdomen following the uncomplicated administration of intravenous contrast (Isovue 370, 100). Imaging was performed in the arterial phase, followed by portal venous phase and then delayed phase. Iodinated contrast was used due to the indications for the examination. Intravenous contrast was used for better evaluation of solid organs and vascular structures. Radiation dose reduction techniques were used for this study:  Our CT scanners use one or all of the following: Automated exposure control, adjustment of the mA and/or kVp according to patient's size, iterative reconstruction. Findings: Right lung base nodular focus not significantly changed in appearance. No pleural effusion. Within liver segment 5 on the early arterial phase there is an 8 mm round hypervascular nodule series 2 image 26 suggesting benign focus. There is conventional hepatic arterial anatomy. No aggressive liver lesion or area of washout identified. The previously described region of possible FDG uptake centrally located in the right hepatic lobe has no CT correlate. No focus of washout. Portal veins are patent. Cholelithiasis without biliary ductal dilatation. The adrenal glands, kidneys, pancreas, spleen unremarkable. No adenopathy, free fluid, free air. No acute or aggressive osseous lesion. IMPRESSION: 1. No aggressive liver lesion identified as outlined above. Pet/ct Tumor Image Skull Thigh W (ini)    Result Date: 2/18/2020  PET/CT: 2/18/2020 INDICATION: Initial diagnosis of right upper lobe lung mass TECHNIQUE: After oral administration of gastroview and intravenous administration of 12.56 mCi of F18 FDG, noncontrast CT images were obtained for attenuation correction and for fusion with emission PET images. A series of overlapping emission PET images were then obtained beginning 60 minutes after injection of FDG. The area imaged spanned the region from the skull base to the mid thighs.  All CT scans performed at this facility use one or all of the following: Automated exposure control, adjustment of the mA and/or kVp according to patient's size, iterative reconstruction. COMPARISON: CT chest without contrast 1/25/2020 FINDINGS: NECK/CHEST: No worrisome activity is seen in the neck. Only benign-appearing muscle activity is seen of paraspinal musculature. No adenopathy is suggested. A large cavitary mass is once again seen in the right upper lobe now best appreciated on axial image 66 measuring 4.9 cm x 4.6 cm in size. This demonstrates relatively intense activity with a maximum SUV of 22.6 g/mL. This is most evident in the posterolateral wall of this lesion. Neoplasm cannot be excluded given the appearance. A separate right lower lobe lesion is seen with the main component measuring 2.6 cm x 1.5 cm in size. This only demonstrates mild activity with maximum SUV of 4 g/mL. No worrisome activity is otherwise seen. Specifically, no adenopathy is suggested. ABDOMEN/PELVIS: Somewhat focal increased activity is seen in the right lobe of the liver best appreciated on axial pet/CT image and PET only images 118 is a concern for a potential hepatic lesion with a potential hepatic metastasis is not excluded. No contour deforming, or hypermetabolic adrenal lesion is seen. No worrisome activity is otherwise seen. No adenopathy is seen in the abdomen or pelvis. Moderate atherosclerotic calcification is seen of the abdominal aorta. At least one gallstone is present without acute inflammatory changes of the gallbladder. Mild diverticulosis is seen of the left colon. The patient appears to be status post hysterectomy. BONES: No aggressive appearing or hypermetabolic osseous lesion is suggested. IMPRESSION: 1. Known right upper and lower lobe lesions. Intense activity is seen associated with the cavitary right upper lobe lesion. Malignancy cannot be excluded.  Only mild increased activity is seen associated with the right lower lobe lesion which could favor an inflammatory etiology although low-grade neoplasm can have a similar appearance. 2. Somewhat focal hypermetabolic activity in the right lobe of the liver which should be further characterized given the inability to exclude pulmonary malignancy. No potential metastatic disease is otherwise suggested. ECOG PERFORMANCE STATUS:  3. PHYSICAL EXAMINATION:  GENERAL:  The patient looks well, in no apparent pain or distress. VITAL SIGNS:  Stable. NECK:  There is no cervical or supraclavicular node enlargement. CHEST EXAMINATION:  Reveals significant decreased air entry with diffuse wheezes bilaterally. ABDOMINAL EXAMINATION:  Reveals no organomegaly or any palpable masses. Both breasts and the axillae reveal no evidence of any palpable disease. EXTREMITIES:  Lower limb reveals no edema. No bony tenderness anywhere. NEUROLOGICAL EXAMINATION:  Reveals the patient is oriented with good mental status. She has good motor power. Sensation and coordination as well as deep tendon reflexes were not tested. IMPRESSION:  Two primaries adenocarcinoma in the right upper lobe and squamous cell carcinoma in the right lower lobe. They were staged as clinical stage N7IO8F0,(IB) ( for the squamous cell carcinoma) and J2aY2A2, (IIA) for the adeno Carcinoma (large lesion). RECOMMENDATIONS:  Those two lesions will be treated with 8 fractions of IMRT, each fraction will deliver 750 cGy per fraction. This dose of treatment for the 8 fractions is BED (biological equivalent dose) 105. This dose is slightly more than delivering 5000 cGy delivered in 5 treatments at 1000 cGy per fraction, which is . Based on many studies, mainly Dr. Tory Schneider from [place], if the BED is more than 100, 5 year overall survival is 71%, if BED is less than 100, local control rate is 57% and 5 year overall survival is 30%. Ms Carolin Homans treatment was explained to her in great details.   The effectiveness of the dose of radiation, which the patient will be given, should control this tumor. I would estimate the chance of control around 90%. Early and delayed side effects were explained to the patient, again with careful planning and normal tissue constraints will not be exceeded. Early side effects, the patient might feel a little tired, might lose a couple of pounds, but she should not develop any other side effects simply because the treatment is focused on those areas and avoid other normal tissue. If the patient developed any acute reaction, that will subside shortly after the patient completes her course of radiation treatment. I should mention that with careful planning, the chance of radiation pneumonitis should not occur. Other rare side effects including cartilage necrosis and bleeding should not occur with careful planning. Late side effects which occur six months to many years, again fibrosis at the site of the tumor. Other rare side effects, cartilage necrosis and bleeding should not occur. The patient understands the benefits versus early and the delayed side effects and agreed to the treatment. She was given a followup appointment. Again, I would like to thank you very much for letting me participate in her care and I will keep you updated on her.       Faustino Gillis MD      [id number]  D:  04/06/2020 18:01  T:  04/07/2020 1:10  JOB #:  [id number]  CC:  MD Valeria Davey MD paresthesias

## 2020-04-09 ENCOUNTER — HOSPITAL ENCOUNTER (OUTPATIENT)
Dept: RADIATION ONCOLOGY | Age: 85
Discharge: HOME | End: 2020-04-09
Payer: MEDICARE

## 2020-04-09 NOTE — PROGRESS NOTES
I spoke with Casandra Shi regarding her Medicare A&B insurance coverage, potential oral medication authorizations, enrollment in the [address] ([facility]) and the [address] ([address]), and assistance organization resource sheet. At this time, Casandra Shi does not wish to participate in the [address] and the [facility] [employer]. Next, I spoke with Casandra Shi regarding her Medicare A&B insurance Coverage. The patient was given the [address] sheet which displayed her insurance benefits. Next, I spoke with Casandra Shi regarding her Medicare Part D Prescription Drug Benefits. With her approval, I applied for the LIS Program to see if she qualifies for help with her Medicare part d prescription drugs. Next, I spoke with Casandra Reveleslarry regarding the Oncology Care Model Notification Letter. I answered questions regarding the costs associated with Medicare Benefits. I explained to Casandra Shi the estimated cost of treatment and services for six months under the Medicare billing policies. Casandra Shi was advised to contact Medicare or their healthcare provider for questions or concerns related to service of care. The Oncology Care Model provides different options of contact for Casandra Shi regarding concerns and complaints of treatments and services. Next, I spoke with Casandra Shi about the financial assistance application. The patient stated she will complete the application and return with the required documentation for processing. Next, I spoke with Casandra Shi about billing questions and treatment services. Next, I spoke with Casandra Shi regarding the [facility]. After reading the form, Casandra Shi signed the Limited Power of  document.   Next, I explained the difference in billing for the [address] and [address] [place]. (which is [facility] Billing for the physicians - through [facility]). RAD ONC  Next, I spoke with Carissa Campbell regarding the pharmacy that is located here on the [address], in our gift shop and available to patients. I also told Carissa Campbell about the Radiation Oncology Scheduling /Nurse's line and informed her that if she needed any assistance in scheduling or rescheduling her appointments or had any issues regarding her radiation oncology appointments to give that number a call and Ms. Daphnie Howell or Ms. Patricia Wiggins will be glad to assist her. Next, I spoke with Carissa Campbell regarding potential oral medication authorizations. I told her that if he ever had any problems getting her oral medications filled to give the dedicated [facility]  a call. Most of the time, it is simply an authorization that needs to be done with the insurance company. Next, I gave Carissa Campbell flyers about the free Yoga classes for cancer survivors and the Oncology Massage program.  I let her know that he can get a free 30 minute massage. Lastly, I gave Carissa Campbell a form with various resource organizations that could assist with specific needs (example:  transportation, lodging, preparing meals, home cleaning)      Carissa Campbell expressed understanding of the information above and all questions were answered to her satisfaction.

## 2020-04-17 ENCOUNTER — HOSPITAL ENCOUNTER (OUTPATIENT)
Dept: RADIATION ONCOLOGY | Age: 85
Discharge: HOME | End: 2020-04-17
Payer: MEDICARE

## 2020-04-17 PROCEDURE — 77399 UNLISTED PX MED RADJ PHYSICS: CPT

## 2020-04-17 PROCEDURE — 77301 RADIOTHERAPY DOSE PLAN IMRT: CPT

## 2020-04-17 PROCEDURE — 77300 RADIATION THERAPY DOSE PLAN: CPT

## 2020-04-17 PROCEDURE — 77293 RESPIRATOR MOTION MGMT SIMUL: CPT

## 2020-04-20 ENCOUNTER — HOSPITAL ENCOUNTER (OUTPATIENT)
Dept: RADIATION ONCOLOGY | Age: 85
Discharge: HOME | End: 2020-04-20
Payer: MEDICARE

## 2020-04-20 PROCEDURE — 77338 DESIGN MLC DEVICE FOR IMRT: CPT

## 2020-04-22 ENCOUNTER — HOSPITAL ENCOUNTER (OUTPATIENT)
Dept: RADIATION ONCOLOGY | Age: 85
Discharge: HOME | End: 2020-04-22
Payer: MEDICARE

## 2020-04-22 PROCEDURE — 77386 HC IMRT TRMT DLVR COMPL: CPT

## 2020-04-24 ENCOUNTER — HOSPITAL ENCOUNTER (OUTPATIENT)
Dept: RADIATION ONCOLOGY | Age: 85
Discharge: HOME | End: 2020-04-24
Payer: MEDICARE

## 2020-04-24 PROCEDURE — 77386 HC IMRT TRMT DLVR COMPL: CPT

## 2020-04-27 ENCOUNTER — HOSPITAL ENCOUNTER (OUTPATIENT)
Dept: RADIATION ONCOLOGY | Age: 85
Discharge: HOME | End: 2020-04-27
Payer: MEDICARE

## 2020-04-27 PROCEDURE — 77386 HC IMRT TRMT DLVR COMPL: CPT

## 2020-04-28 ENCOUNTER — HOSPITAL ENCOUNTER (OUTPATIENT)
Dept: RADIATION ONCOLOGY | Age: 85
Discharge: HOME | End: 2020-04-28
Payer: MEDICARE

## 2020-04-28 PROCEDURE — 77386 HC IMRT TRMT DLVR COMPL: CPT

## 2020-04-28 PROCEDURE — 77336 RADIATION PHYSICS CONSULT: CPT

## 2020-04-28 NOTE — PROGRESS NOTES
Patient: Patrick Powell MRN: [id number]  SSN: [id number]    YOB: 1933  Age: 80 y.o. Sex: female      DIAGNOSIS:  Lung cancere  TREATMENT SITE: lung    DOSE and FRACTIONATION:  3750/6000 cGy, 5/8 fractions. INTERVAL HISTORY:  Patrick Powell is a 80 y.o. female being treated for lung cancer. She was doing well without complaints week 1. OBJECTIVE:  No findings week 1. There were no vitals taken for this visit. Lab Results   Component Value Date/Time    Sodium 139 04/01/2020 12:40 PM    Potassium 3.9 04/01/2020 12:40 PM    Chloride 107 04/01/2020 12:40 PM    CO2 27 04/01/2020 12:40 PM    Anion gap 5 (L) 04/01/2020 12:40 PM    Glucose 123 (H) 04/01/2020 12:40 PM    BUN 15 04/01/2020 12:40 PM    Creatinine 1.18 (H) 04/01/2020 12:40 PM    GFR est AA 56 (L) 04/01/2020 12:40 PM    GFR est non-AA 46 (L) 04/01/2020 12:40 PM    Calcium 9.3 04/01/2020 12:40 PM    Magnesium 2.0 10/21/2009 08:45 AM    Albumin 3.6 04/01/2020 12:40 PM    Protein, total 7.6 04/01/2020 12:40 PM    Globulin 4.0 (H) 04/01/2020 12:40 PM    A-G Ratio 0.9 (L) 04/01/2020 12:40 PM    AST (SGOT) 10 (L) 04/01/2020 12:40 PM    ALT (SGPT) 13 04/01/2020 12:40 PM     Lab Results   Component Value Date/Time    WBC 10.8 04/01/2020 12:40 PM    HGB 13.7 04/01/2020 12:40 PM    HCT 42.9 04/01/2020 12:40 PM    PLATELET 292 68/59/7170 12:40 PM       ASSESSMENT and PLAN:  Patrick Powell is tolerating radiation as anticipated for the current dose and fraction. We will continue on as planned with another treatment visit anticipated next week.         Bettina Severe, MD   April 28, 2020

## 2020-04-29 ENCOUNTER — HOSPITAL ENCOUNTER (OUTPATIENT)
Dept: RADIATION ONCOLOGY | Age: 85
Discharge: HOME | End: 2020-04-29
Payer: MEDICARE

## 2020-04-29 PROCEDURE — 77386 HC IMRT TRMT DLVR COMPL: CPT

## 2020-04-30 ENCOUNTER — HOSPITAL ENCOUNTER (OUTPATIENT)
Dept: RADIATION ONCOLOGY | Age: 85
Discharge: HOME | End: 2020-04-30
Payer: MEDICARE

## 2020-04-30 PROCEDURE — 77386 HC IMRT TRMT DLVR COMPL: CPT

## 2020-05-01 ENCOUNTER — HOSPITAL ENCOUNTER (OUTPATIENT)
Dept: RADIATION ONCOLOGY | Age: 85
Discharge: HOME | End: 2020-05-01
Payer: MEDICARE

## 2020-05-01 DIAGNOSIS — C34.11 MALIGNANT NEOPLASM OF UPPER LOBE OF RIGHT LUNG (HCC): Primary | ICD-10-CM

## 2020-05-01 PROCEDURE — 77386 HC IMRT TRMT DLVR COMPL: CPT

## 2020-05-01 PROCEDURE — 77336 RADIATION PHYSICS CONSULT: CPT

## 2020-05-01 NOTE — DISCHARGE SUMMARY
Patient: Danielle Rawls MRN: [id number]  SSN: [id number]    YOB: 1933  Age: 80 y.o. Sex: female      Danielle Rawls is a 80 y.o. female seen initially by Dr. Jaimie Rosa. Ms Sanjeev Koroma basically presented with hemoptysis and subsequent chest x-ray on 01/07/2020 showed a cavity in the right upper lobe of the lung. CT of the chest on 01/25/2020 showed large cavitary upper lobe mass with small air fluid and a second peripheral right lower lobe lesion. PET scan on 02/18/2020 which showed two suspicious areas in her right lung. She underwent navigation bronchoscopy on 02/19 with some sampling from both of the sites. There was a possible liver lesion noted but this was negative on triple-phase CT. On pathological examination, right upper lobe mass was adenocarcinoma. 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     The patient subsequently was seen by Dr. Reagan Osorio, her medical oncologist, for assessment and he subsequently referred her to us for possible SBRT.     When Dr. Jaimie Rosa saw the patient, she basically felt well. Denied any significant symptoms. Dr. Lori Hawkins mentioned the patient had diagnosis of colon cancer treated with 10 cycles of FOLFOX which was completed in 2010, and basically she got some degree of neuropathy which currently is being treated with Norco.  She has known COPD. She smoked for 40 years. The patient otherwise stated that she feels reasonably well for her age and denied any significant symptoms. She wanted this treatment to be short and effective or curative. Otherwise, she did not want the treatment. The recommendation was to treat with 8 fractions of IMRT, each fraction would deliver 750 cGy per fractions. Please see the details of her treatment below as well as my plans for future care and surveillance. Please do not hesitate to call with questions or concerns at any time.       DIAGNOSIS: Newly diagnosed lung cancer for assessment for radiation treatment. PREVIOUS TREATMENT:    1) 10 cycles of FOLFOX which was completed in 2010  2)  Navigation bronchoscopy on 02/19/2020    TREATMENT DATES:  4/22/2020 - 5/1/2020    ANATOMIC SITE:  Lung    DOSE:  6000 cGy in 8 fractions    BEAM ARRANGEMENT:  SRS ARC    CHEMOTHERAPY: None    TREATMENT COURSE: Gayle Hi did well without complaints week 1 and 2. She was able to complete treatment as planned. PLAN:  The patient will be seen in follow up in 4 weeks to assess acute and sub acute side effects.       Eva Cortez NP

## 2020-05-06 ENCOUNTER — HOSPITAL ENCOUNTER (OUTPATIENT)
Dept: GENERAL RADIOLOGY | Age: 85
Discharge: HOME | End: 2020-05-06
Payer: MEDICARE

## 2020-05-06 DIAGNOSIS — C34.90 MALIGNANT NEOPLASM OF LUNG, UNSPECIFIED LATERALITY, UNSPECIFIED PART OF LUNG (HCC): ICD-10-CM

## 2020-05-06 DIAGNOSIS — J44.9 CHRONIC OBSTRUCTIVE PULMONARY DISEASE, UNSPECIFIED COPD TYPE (HCC): ICD-10-CM

## 2020-05-06 DIAGNOSIS — R04.2 HEMOPTYSIS: ICD-10-CM

## 2020-05-06 PROCEDURE — 71046 X-RAY EXAM CHEST 2 VIEWS: CPT

## 2020-05-08 ENCOUNTER — HOSPITAL ENCOUNTER (OUTPATIENT)
Dept: CT IMAGING | Age: 85
Discharge: HOME | End: 2020-05-08
Attending: INTERNAL MEDICINE

## 2020-05-08 DIAGNOSIS — R04.2 HEMOPTYSIS: ICD-10-CM

## 2020-05-08 DIAGNOSIS — R06.03 ACUTE RESPIRATORY DISTRESS: ICD-10-CM

## 2020-05-08 DIAGNOSIS — C34.11 MALIGNANT NEOPLASM OF UPPER LOBE OF RIGHT LUNG (HCC): ICD-10-CM

## 2020-05-08 RX ORDER — SODIUM CHLORIDE 0.9 % (FLUSH) 0.9 %
10 SYRINGE (ML) INJECTION
Status: COMPLETED | OUTPATIENT
Start: 2020-05-08 | End: 2020-05-08

## 2020-05-08 RX ADMIN — Medication 10 ML: at 14:11

## 2020-05-18 ENCOUNTER — HOSPITAL ENCOUNTER (OUTPATIENT)
Dept: LAB | Age: 85
Discharge: HOME | End: 2020-05-18
Payer: MEDICARE

## 2020-05-18 DIAGNOSIS — R00.0 TACHYCARDIA: ICD-10-CM

## 2020-05-18 LAB — TSH SERPL DL<=0.005 MIU/L-ACNC: 3.28 UIU/ML (ref 0.36–3.74)

## 2020-05-18 PROCEDURE — 84443 ASSAY THYROID STIM HORMONE: CPT

## 2020-05-18 PROCEDURE — 36415 COLL VENOUS BLD VENIPUNCTURE: CPT

## 2020-05-19 NOTE — PROGRESS NOTES
Please let her know that TSH was WNL. Obtained due to resting tachycardia, which he states that she has had for many years.

## 2020-05-22 NOTE — PROGRESS NOTES
Patient: Maritza Chaidez MRN: [id number]  SSN: [id number]    YOB: 1933  Age: 80 y.o. Sex: female      DIAGNOSIS:  Lung cancere  TREATMENT SITE: lung    DOSE and FRACTIONATION:  4500/6000 cGy, 6/8 fractions. INTERVAL HISTORY:  Maritza Chaidez is a 80 y.o. female being treated for lung cancer. She was doing well without new complaints. OBJECTIVE:  No acute reactions    There were no vitals taken for this visit. Lab Results   Component Value Date/Time    Sodium 139 04/01/2020 12:40 PM    Potassium 3.9 04/01/2020 12:40 PM    Chloride 107 04/01/2020 12:40 PM    CO2 27 04/01/2020 12:40 PM    Anion gap 5 (L) 04/01/2020 12:40 PM    Glucose 123 (H) 04/01/2020 12:40 PM    BUN 15 04/01/2020 12:40 PM    Creatinine 1.18 (H) 04/01/2020 12:40 PM    GFR est AA 56 (L) 04/01/2020 12:40 PM    GFR est non-AA 46 (L) 04/01/2020 12:40 PM    Calcium 9.3 04/01/2020 12:40 PM    Magnesium 2.0 10/21/2009 08:45 AM    Albumin 3.6 04/01/2020 12:40 PM    Protein, total 7.6 04/01/2020 12:40 PM    Globulin 4.0 (H) 04/01/2020 12:40 PM    A-G Ratio 0.9 (L) 04/01/2020 12:40 PM    AST (SGOT) 10 (L) 04/01/2020 12:40 PM    ALT (SGPT) 13 04/01/2020 12:40 PM     Lab Results   Component Value Date/Time    WBC 10.8 04/01/2020 12:40 PM    HGB 13.7 04/01/2020 12:40 PM    HCT 42.9 04/01/2020 12:40 PM    PLATELET 876 33/66/9386 12:40 PM       ASSESSMENT and PLAN:  Maritza Chaidez is tolerating radiation as anticipated for the current dose and fraction.         Jaimee Hatch MD

## 2020-06-04 ENCOUNTER — APPOINTMENT (OUTPATIENT)
Dept: GENERAL RADIOLOGY | Age: 85
End: 2020-06-04
Attending: EMERGENCY MEDICINE
Payer: MEDICARE

## 2020-06-04 ENCOUNTER — HOSPITAL ENCOUNTER (EMERGENCY)
Age: 85
Discharge: HOME | End: 2020-06-04
Attending: EMERGENCY MEDICINE
Payer: MEDICARE

## 2020-06-04 VITALS
RESPIRATION RATE: 24 BRPM | DIASTOLIC BLOOD PRESSURE: 78 MMHG | HEART RATE: 116 BPM | OXYGEN SATURATION: 97 % | HEIGHT: 67 IN | BODY MASS INDEX: 34.37 KG/M2 | SYSTOLIC BLOOD PRESSURE: 152 MMHG | TEMPERATURE: 98 F | WEIGHT: 219 LBS

## 2020-06-04 DIAGNOSIS — C34.11 MALIGNANT NEOPLASM OF UPPER LOBE OF RIGHT LUNG (HCC): ICD-10-CM

## 2020-06-04 DIAGNOSIS — R06.02 SOB (SHORTNESS OF BREATH): Primary | ICD-10-CM

## 2020-06-04 DIAGNOSIS — J44.1 CHRONIC OBSTRUCTIVE PULMONARY DISEASE WITH ACUTE EXACERBATION (HCC): ICD-10-CM

## 2020-06-04 DIAGNOSIS — R91.8 PULMONARY INFILTRATE: ICD-10-CM

## 2020-06-04 LAB
ALBUMIN SERPL-MCNC: 3.4 G/DL (ref 3.2–4.6)
ALBUMIN/GLOB SERPL: 0.8 {RATIO} (ref 1.2–3.5)
ALP SERPL-CCNC: 79 U/L (ref 50–136)
ALT SERPL-CCNC: 25 U/L (ref 12–65)
ANION GAP SERPL CALC-SCNC: 8 MMOL/L (ref 7–16)
AST SERPL-CCNC: 15 U/L (ref 15–37)
BASOPHILS # BLD: 0 K/UL (ref 0–0.2)
BASOPHILS NFR BLD: 0 % (ref 0–2)
BILIRUB SERPL-MCNC: 0.6 MG/DL (ref 0.2–1.1)
BUN SERPL-MCNC: 19 MG/DL (ref 8–23)
CALCIUM SERPL-MCNC: 9.4 MG/DL (ref 8.3–10.4)
CHLORIDE SERPL-SCNC: 105 MMOL/L (ref 98–107)
CO2 SERPL-SCNC: 27 MMOL/L (ref 21–32)
CREAT SERPL-MCNC: 1.05 MG/DL (ref 0.6–1)
CRP SERPL-MCNC: 8 MG/DL (ref 0–0.9)
DIFFERENTIAL METHOD BLD: ABNORMAL
EOSINOPHIL # BLD: 0.1 K/UL (ref 0–0.8)
EOSINOPHIL NFR BLD: 1 % (ref 0.5–7.8)
ERYTHROCYTE [DISTWIDTH] IN BLOOD BY AUTOMATED COUNT: 13.5 % (ref 11.9–14.6)
GLOBULIN SER CALC-MCNC: 4.2 G/DL (ref 2.3–3.5)
GLUCOSE SERPL-MCNC: 135 MG/DL (ref 65–100)
HCT VFR BLD AUTO: 41.3 % (ref 35.8–46.3)
HGB BLD-MCNC: 12.9 G/DL (ref 11.7–15.4)
IMM GRANULOCYTES # BLD AUTO: 0.1 K/UL (ref 0–0.5)
IMM GRANULOCYTES NFR BLD AUTO: 1 % (ref 0–5)
LACTATE SERPL-SCNC: 1.3 MMOL/L (ref 0.4–2)
LYMPHOCYTES # BLD: 0.5 K/UL (ref 0.5–4.6)
LYMPHOCYTES NFR BLD: 6 % (ref 13–44)
MCH RBC QN AUTO: 30 PG (ref 26.1–32.9)
MCHC RBC AUTO-ENTMCNC: 31.2 G/DL (ref 31.4–35)
MCV RBC AUTO: 96 FL (ref 79.6–97.8)
MONOCYTES # BLD: 0.8 K/UL (ref 0.1–1.3)
MONOCYTES NFR BLD: 9 % (ref 4–12)
NEUTS SEG # BLD: 7.4 K/UL (ref 1.7–8.2)
NEUTS SEG NFR BLD: 83 % (ref 43–78)
NRBC # BLD: 0 K/UL (ref 0–0.2)
PLATELET # BLD AUTO: 197 K/UL (ref 150–450)
PMV BLD AUTO: 10.6 FL (ref 9.4–12.3)
POTASSIUM SERPL-SCNC: 4.2 MMOL/L (ref 3.5–5.1)
PROCALCITONIN SERPL-MCNC: 0.06 NG/ML
PROT SERPL-MCNC: 7.6 G/DL (ref 6.3–8.2)
RBC # BLD AUTO: 4.3 M/UL (ref 4.05–5.2)
SODIUM SERPL-SCNC: 140 MMOL/L (ref 136–145)
WBC # BLD AUTO: 9 K/UL (ref 4.3–11.1)

## 2020-06-04 PROCEDURE — 96365 THER/PROPH/DIAG IV INF INIT: CPT

## 2020-06-04 PROCEDURE — 77030013140 HC MSK NEB VYRM -A

## 2020-06-04 PROCEDURE — 96375 TX/PRO/DX INJ NEW DRUG ADDON: CPT

## 2020-06-04 PROCEDURE — 74011250636 HC RX REV CODE- 250/636: Performed by: EMERGENCY MEDICINE

## 2020-06-04 PROCEDURE — 86140 C-REACTIVE PROTEIN: CPT

## 2020-06-04 PROCEDURE — 84145 PROCALCITONIN (PCT): CPT

## 2020-06-04 PROCEDURE — 93005 ELECTROCARDIOGRAM TRACING: CPT | Performed by: EMERGENCY MEDICINE

## 2020-06-04 PROCEDURE — 94640 AIRWAY INHALATION TREATMENT: CPT

## 2020-06-04 PROCEDURE — 99285 EMERGENCY DEPT VISIT HI MDM: CPT

## 2020-06-04 PROCEDURE — 87040 BLOOD CULTURE FOR BACTERIA: CPT

## 2020-06-04 PROCEDURE — 80053 COMPREHEN METABOLIC PANEL: CPT

## 2020-06-04 PROCEDURE — 74011000250 HC RX REV CODE- 250: Performed by: EMERGENCY MEDICINE

## 2020-06-04 PROCEDURE — 71046 X-RAY EXAM CHEST 2 VIEWS: CPT

## 2020-06-04 PROCEDURE — 74011000258 HC RX REV CODE- 258: Performed by: EMERGENCY MEDICINE

## 2020-06-04 PROCEDURE — 83605 ASSAY OF LACTIC ACID: CPT

## 2020-06-04 PROCEDURE — 85025 COMPLETE CBC W/AUTO DIFF WBC: CPT

## 2020-06-04 RX ORDER — DOXYCYCLINE HYCLATE 100 MG
100 TABLET ORAL 2 TIMES DAILY
Qty: 14 TAB | Refills: 0 | Status: SHIPPED | OUTPATIENT
Start: 2020-06-04

## 2020-06-04 RX ORDER — IPRATROPIUM BROMIDE AND ALBUTEROL SULFATE 2.5; .5 MG/3ML; MG/3ML
3 SOLUTION RESPIRATORY (INHALATION)
Status: COMPLETED | OUTPATIENT
Start: 2020-06-04 | End: 2020-06-04

## 2020-06-04 RX ORDER — PREDNISONE 20 MG/1
TABLET ORAL
Qty: 15 TAB | Refills: 0 | Status: SHIPPED | OUTPATIENT
Start: 2020-06-04

## 2020-06-04 RX ADMIN — IPRATROPIUM BROMIDE AND ALBUTEROL SULFATE 3 ML: .5; 3 SOLUTION RESPIRATORY (INHALATION) at 20:21

## 2020-06-04 RX ADMIN — METHYLPREDNISOLONE SODIUM SUCCINATE 125 MG: 125 INJECTION, POWDER, FOR SOLUTION INTRAMUSCULAR; INTRAVENOUS at 19:57

## 2020-06-04 RX ADMIN — CEFTRIAXONE SODIUM 1 G: 1 INJECTION, POWDER, FOR SOLUTION INTRAMUSCULAR; INTRAVENOUS at 20:06

## 2020-06-04 NOTE — ED PROVIDER NOTES
This is an 49-year-old female has a history of COPD and is being treated for lung cancer. No chemotherapy but radiation therapy, last episode 4 weeks ago. She has had progressive shortness of breath over the last few months. Worse in the last month especially worse in the last week or so. She has had some cough but no sputum no hemoptysis. She has had no fever or chills. She denies any particular chest pain or swelling. Saw her oncologist who ordered a CT scan yesterday. Patient has been using her 's oxygen for relief. Denies any heart issues. CT scan evidently showed increased activity in the right upper lobe concerning for some extravasation of contrast and some slight infiltrates that may be radiation pneumonitis or infectious. Patient was asked to come here for consideration of admission. The history is provided by the patient. Shortness of Breath   This is a new problem. The current episode started more than 1 week ago. The problem has been gradually worsening. Associated symptoms include cough and wheezing. Pertinent negatives include no fever, no headaches, no neck pain, no sputum production, no hemoptysis, no orthopnea, no chest pain, no syncope, no vomiting, no abdominal pain, no rash, no leg pain and no leg swelling. She has tried ipratropium inhalers and beta-agonist inhalers for the symptoms. She has had prior hospitalizations. Associated medical issues do not include COPD, PE, DVT or recent surgery.         Past Medical History:   Diagnosis Date    Arthritis     bilat knees    Cancer ([facility] [address]     COLON- dx- 5/2009- surg and chemo-- last chemo 1/10    Chronic pain     pain in both knees neuropathy in both legs and feet    COPD     3/10 per pt states pulmonary m.d. said it was r/t chemo    Neuropathy     s/p chemo    Supplemental oxygen dependent        Past Surgical History:   Procedure Laterality Date    ABDOMEN SURGERY PROC UNLISTED  2009    colon resection, cancer    HX BREAST LUMPECTOMY      RIGHT AND LEFT    HX CATARACT REMOVAL      with alley iol implants    HX HYSTERECTOMY      HX KNEE ARTHROSCOPY      RIGHT    HX VASCULAR ACCESS      port and removed 2010    VASCULAR SURGERY PROCEDURE UNLIST  age late 19's    VARICOSE VEIN STRIPPING         Family History:   Problem Relation Age of Onset    Cancer Father     Cancer Sister        Social History     Socioeconomic History    Marital status:      Spouse name: Not on file    Number of children: Not on file    Years of education: Not on file    Highest education level: Not on file   Occupational History    Not on file   Social Needs    Financial resource strain: Not on file    Food insecurity     Worry: Not on file     Inability: Not on file    Transportation needs     Medical: Not on file     Non-medical: Not on file   Tobacco Use    Smoking status: Former Smoker     Packs/day: 0.25     Years: 43.00     Pack years: 10.75     Types: Cigarettes     Last attempt to quit:      Years since quittin.4    Smokeless tobacco: Never Used   Substance and Sexual Activity    Alcohol use: No    Drug use: No    Sexual activity: Not on file   Lifestyle    Physical activity     Days per week: Not on file     Minutes per session: Not on file    Stress: Not on file   Relationships    Social connections     Talks on phone: Not on file     Gets together: Not on file     Attends [employer] service: Not on file     Active member of club or organization: Not on file     Attends meetings of clubs or organizations: Not on file     Relationship status: Not on file    Intimate partner violence     Fear of current or ex partner: Not on file     Emotionally abused: Not on file     Physically abused: Not on file     Forced sexual activity: Not on file   Other Topics Concern    Not on file   Social History Narrative    Not on file         ALLERGIES: Pcn [penicillins];  Tetanus vaccines and toxoid; and Levaquin [levofloxacin]    Review of Systems   Constitutional: Negative for chills and fever. Respiratory: Positive for cough, shortness of breath and wheezing. Negative for hemoptysis and sputum production. Cardiovascular: Negative for chest pain, palpitations, orthopnea, leg swelling and syncope. Gastrointestinal: Negative for abdominal pain, diarrhea and vomiting. Genitourinary: Negative for dysuria and flank pain. Musculoskeletal: Negative for back pain and neck pain. Skin: Negative for color change and rash. Neurological: Negative for syncope and headaches. All other systems reviewed and are negative. Vitals:    06/04/20 1552 06/04/20 1922 06/04/20 1931 06/04/20 1933   BP: 141/80      Pulse: 100 (!) 109 (!) 115 (!) 124   Resp: 20      Temp: 98.2 °F (36.8 °C)      SpO2: 97% 97% 95% 97%   Weight: 99.3 kg (219 lb)      Height: 5' 7\" (1.702 m)               Physical Exam  Vitals signs and nursing note reviewed. Constitutional:       General: She is not in acute distress. Appearance: She is well-developed. HENT:      Head: Normocephalic and atraumatic. Right Ear: External ear normal.      Left Ear: External ear normal.      Mouth/Throat:      Pharynx: No oropharyngeal exudate. Eyes:      Conjunctiva/sclera: Conjunctivae normal.      Pupils: Pupils are equal, round, and reactive to light. Neck:      Musculoskeletal: Normal range of motion and neck supple. Cardiovascular:      Rate and Rhythm: Normal rate and regular rhythm. Heart sounds: No murmur. Pulmonary:      Effort: No respiratory distress. Breath sounds: Wheezing present. Abdominal:      General: Bowel sounds are normal.      Palpations: Abdomen is soft. There is no mass. Tenderness: There is no abdominal tenderness. There is no guarding or rebound. Hernia: No hernia is present. Skin:     General: Skin is warm and dry.    Neurological:      Mental Status: She is alert and oriented to person, place, and time.      Gait: Gait normal.      Comments: Nl speech   Psychiatric:         Speech: Speech normal.          MDM  Number of Diagnoses or Management Options  Diagnosis management comments: Screening blood work and chest x-ray. Patient says she always runs of fast heart rate. She does not seem to exhibit any respiratory distress and today complete sentences without difficulty. Pulmonary embolism essentially ruled out with yesterday CT scan. Possibility of pneumonia. Check screening blood work.        Amount and/or Complexity of Data Reviewed  Clinical lab tests: ordered and reviewed  Tests in the radiology section of CPT®: ordered and reviewed  Discuss the patient with other providers: yes  Independent visualization of images, tracings, or specimens: yes    Risk of Complications, Morbidity, and/or Mortality  Presenting problems: moderate  Diagnostic procedures: minimal  Management options: low    Patient Progress  Patient progress: stable         Procedures    Results Include:    Recent Results (from the past 24 hour(s))   EKG, 12 LEAD, INITIAL    Collection Time: 06/04/20  3:53 PM   Result Value Ref Range    Ventricular Rate 117 BPM    Atrial Rate 117 BPM    P-R Interval 154 ms    QRS Duration 70 ms    Q-T Interval 314 ms    QTC Calculation (Bezet) 438 ms    Calculated P Axis 78 degrees    Calculated R Axis 77 degrees    Calculated T Axis 62 degrees    Diagnosis       Sinus tachycardia with Premature atrial complexes  Otherwise normal ECG  When compared with ECG of 01-SEP-2009 13:55,  Premature atrial complexes are now Present  T wave amplitude has decreased in Lateral leads     CBC WITH AUTOMATED DIFF    Collection Time: 06/04/20  3:56 PM   Result Value Ref Range    WBC 9.0 4.3 - 11.1 K/uL    RBC 4.30 4.05 - 5.2 M/uL    HGB 12.9 11.7 - 15.4 g/dL    HCT 41.3 35.8 - 46.3 %    MCV 96.0 79.6 - 97.8 FL    MCH 30.0 26.1 - 32.9 PG    MCHC 31.2 (L) 31.4 - 35.0 g/dL    RDW 13.5 11.9 - 14.6 %    PLATELET 718 285 - 837 K/uL    MPV 10.6 9.4 - 12.3 FL    ABSOLUTE NRBC 0.00 0.0 - 0.2 K/uL    DF AUTOMATED      NEUTROPHILS 83 (H) 43 - 78 %    LYMPHOCYTES 6 (L) 13 - 44 %    MONOCYTES 9 4.0 - 12.0 %    EOSINOPHILS 1 0.5 - 7.8 %    BASOPHILS 0 0.0 - 2.0 %    IMMATURE GRANULOCYTES 1 0.0 - 5.0 %    ABS. NEUTROPHILS 7.4 1.7 - 8.2 K/UL    ABS. LYMPHOCYTES 0.5 0.5 - 4.6 K/UL    ABS. MONOCYTES 0.8 0.1 - 1.3 K/UL    ABS. EOSINOPHILS 0.1 0.0 - 0.8 K/UL    ABS. BASOPHILS 0.0 0.0 - 0.2 K/UL    ABS. IMM. GRANS. 0.1 0.0 - 0.5 K/UL   METABOLIC PANEL, COMPREHENSIVE    Collection Time: 06/04/20  3:56 PM   Result Value Ref Range    Sodium 140 136 - 145 mmol/L    Potassium 4.2 3.5 - 5.1 mmol/L    Chloride 105 98 - 107 mmol/L    CO2 27 21 - 32 mmol/L    Anion gap 8 7 - 16 mmol/L    Glucose 135 (H) 65 - 100 mg/dL    BUN 19 8 - 23 MG/DL    Creatinine 1.05 (H) 0.6 - 1.0 MG/DL    GFR est AA >60 >60 ml/min/1.73m2    GFR est non-AA 53 (L) >60 ml/min/1.73m2    Calcium 9.4 8.3 - 10.4 MG/DL    Bilirubin, total 0.6 0.2 - 1.1 MG/DL    ALT (SGPT) 25 12 - 65 U/L    AST (SGOT) 15 15 - 37 U/L    Alk. phosphatase 79 50 - 136 U/L    Protein, total 7.6 6.3 - 8.2 g/dL    Albumin 3.4 3.2 - 4.6 g/dL    Globulin 4.2 (H) 2.3 - 3.5 g/dL    A-G Ratio 0.8 (L) 1.2 - 3.5       Xr Chest Pa Lat    Result Date: 6/4/2020  History: SOB Two views chest COMPARISON: 5/6/2020 Findings: There is a cavitary mass in the right upper lobe, as was seen previously. No new alveolar infiltrate or pleural effusion. The cardiac silhouette, and mediastinal contour, and osseous structures are stable. Impression: Stable exam with persistent cavitary mass in the right upper lobe, compatible with the patient's known history of lung cancer. Discussed with the pulmonologist on call. He felt that if a pro calcitonin was reasonably low, patient can be treated with antibiotics and steroids at home with temporal office follow-up.   This may be better than admission to cut down the chance of patient picking up any nosocomial infections. There will be consideration of outpatient oxygen, bronchoscopy. Results Include:    Recent Results (from the past 24 hour(s))   EKG, 12 LEAD, INITIAL    Collection Time: 06/04/20  3:53 PM   Result Value Ref Range    Ventricular Rate 117 BPM    Atrial Rate 117 BPM    P-R Interval 154 ms    QRS Duration 70 ms    Q-T Interval 314 ms    QTC Calculation (Bezet) 438 ms    Calculated P Axis 78 degrees    Calculated R Axis 77 degrees    Calculated T Axis 62 degrees    Diagnosis       Sinus tachycardia with Premature atrial complexes  Otherwise normal ECG  When compared with ECG of 01-SEP-2009 13:55,  Premature atrial complexes are now Present  T wave amplitude has decreased in Lateral leads     CBC WITH AUTOMATED DIFF    Collection Time: 06/04/20  3:56 PM   Result Value Ref Range    WBC 9.0 4.3 - 11.1 K/uL    RBC 4.30 4.05 - 5.2 M/uL    HGB 12.9 11.7 - 15.4 g/dL    HCT 41.3 35.8 - 46.3 %    MCV 96.0 79.6 - 97.8 FL    MCH 30.0 26.1 - 32.9 PG    MCHC 31.2 (L) 31.4 - 35.0 g/dL    RDW 13.5 11.9 - 14.6 %    PLATELET 020 651 - 685 K/uL    MPV 10.6 9.4 - 12.3 FL    ABSOLUTE NRBC 0.00 0.0 - 0.2 K/uL    DF AUTOMATED      NEUTROPHILS 83 (H) 43 - 78 %    LYMPHOCYTES 6 (L) 13 - 44 %    MONOCYTES 9 4.0 - 12.0 %    EOSINOPHILS 1 0.5 - 7.8 %    BASOPHILS 0 0.0 - 2.0 %    IMMATURE GRANULOCYTES 1 0.0 - 5.0 %    ABS. NEUTROPHILS 7.4 1.7 - 8.2 K/UL    ABS. LYMPHOCYTES 0.5 0.5 - 4.6 K/UL    ABS. MONOCYTES 0.8 0.1 - 1.3 K/UL    ABS. EOSINOPHILS 0.1 0.0 - 0.8 K/UL    ABS. BASOPHILS 0.0 0.0 - 0.2 K/UL    ABS. IMM.  GRANS. 0.1 0.0 - 0.5 K/UL   METABOLIC PANEL, COMPREHENSIVE    Collection Time: 06/04/20  3:56 PM   Result Value Ref Range    Sodium 140 136 - 145 mmol/L    Potassium 4.2 3.5 - 5.1 mmol/L    Chloride 105 98 - 107 mmol/L    CO2 27 21 - 32 mmol/L    Anion gap 8 7 - 16 mmol/L    Glucose 135 (H) 65 - 100 mg/dL    BUN 19 8 - 23 MG/DL    Creatinine 1.05 (H) 0.6 - 1.0 MG/DL    GFR est AA >60 >60 ml/min/1.73m2 GFR est non-AA 53 (L) >60 ml/min/1.73m2    Calcium 9.4 8.3 - 10.4 MG/DL    Bilirubin, total 0.6 0.2 - 1.1 MG/DL    ALT (SGPT) 25 12 - 65 U/L    AST (SGOT) 15 15 - 37 U/L    Alk. phosphatase 79 50 - 136 U/L    Protein, total 7.6 6.3 - 8.2 g/dL    Albumin 3.4 3.2 - 4.6 g/dL    Globulin 4.2 (H) 2.3 - 3.5 g/dL    A-G Ratio 0.8 (L) 1.2 - 3.5     PROCALCITONIN    Collection Time: 06/04/20  3:56 PM   Result Value Ref Range    Procalcitonin 0.06 ng/mL   LACTIC ACID    Collection Time: 06/04/20  7:59 PM   Result Value Ref Range    Lactic acid 1.3 0.4 - 2.0 MMOL/L     Xr Chest Pa Lat    Result Date: 6/4/2020  History: SOB Two views chest COMPARISON: 5/6/2020 Findings: There is a cavitary mass in the right upper lobe, as was seen previously. No new alveolar infiltrate or pleural effusion. The cardiac silhouette, and mediastinal contour, and osseous structures are stable. Impression: Stable exam with persistent cavitary mass in the right upper lobe, compatible with the patient's known history of lung cancer.

## 2020-06-04 NOTE — ED TRIAGE NOTES
Patient brought into triage in wheel chair. Patient reports increased SOB. Seen PCP today. Patient has lung cancer. Last radiation was May 1, 2020. Patient has CT scan yesterday and was told by PCP today she had an infection in right lung and rib fx. Patient wears continuous home oxygen of 3LNC.

## 2020-06-05 ENCOUNTER — PATIENT OUTREACH (OUTPATIENT)
Dept: CASE MANAGEMENT | Age: 85
End: 2020-06-05

## 2020-06-05 LAB
ATRIAL RATE: 117 BPM
CALCULATED P AXIS, ECG09: 78 DEGREES
CALCULATED R AXIS, ECG10: 77 DEGREES
CALCULATED T AXIS, ECG11: 62 DEGREES
DIAGNOSIS, 93000: NORMAL
P-R INTERVAL, ECG05: 154 MS
Q-T INTERVAL, ECG07: 314 MS
QRS DURATION, ECG06: 70 MS
QTC CALCULATION (BEZET), ECG08: 438 MS
VENTRICULAR RATE, ECG03: 117 BPM

## 2020-06-05 NOTE — DISCHARGE INSTRUCTIONS
Call your pulmonologist tomorrow to recheck for further treatments and testing. Recheck sooner for worse or worrisome symptoms. Patient Education        Chronic Obstructive Pulmonary Disease (COPD): Care Instructions  Your Care Instructions     Chronic obstructive pulmonary disease (COPD) is a general term for a group of lung diseases, including emphysema and chronic bronchitis. People with COPD have decreased airflow in and out of the lungs, which makes it hard to breathe. The airways also can get clogged with thick mucus. Cigarette smoking is a major cause of COPD. Although there is no cure for COPD, you can slow its progress. Following your treatment plan and taking care of yourself can help you feel better and live longer. Follow-up care is a key part of your treatment and safety. Be sure to make and go to all appointments, and call your doctor if you are having problems. It's also a good idea to know your test results and keep a list of the medicines you take. How can you care for yourself at home? Staying healthy  · Do not smoke. This is the most important step you can take to prevent more damage to your lungs. If you need help quitting, talk to your doctor about stop-smoking programs and medicines. These can increase your chances of quitting for good. · Avoid colds and flu. Get a pneumococcal vaccine shot. If you have had one before, ask your doctor whether you need a second dose. Get the flu vaccine every fall. If you must be around people with colds or the flu, wash your hands often. · Avoid secondhand smoke, air pollution, and high altitudes. Also avoid cold, dry air and hot, humid air. Stay at home with your windows closed when air pollution is bad. Medicines and oxygen therapy  · Take your medicines exactly as prescribed. Call your doctor if you think you are having a problem with your medicine. You may be taking medicines such as:  ? Bronchodilators.  These help open your airways and make breathing easier. They are either short-acting (work for 6 to 9 hours) or long-acting (work for 24 hours). You inhale most bronchodilators, so they start to act quickly. Always carry your quick-relief inhaler with you in case you need it while you are away from home. ? Corticosteroids (prednisone, budesonide). These reduce airway inflammation. They come in pill or inhaled form. You must take these medicines every day for them to work well. · Ask your doctor or pharmacist if a spacer is right for you. A spacer may help you get more inhaled medicine to your lungs. If you use one, ask how to use it properly. · Do not take any vitamins, over-the-counter medicine, or herbal products without talking to your doctor first.  · If your doctor prescribed antibiotics, take them as directed. Do not stop taking them just because you feel better. You need to take the full course of antibiotics. · If you use oxygen therapy, use the flow rate your doctor has recommended. Don't change it without talking to your doctor first. Oxygen therapy boosts the amount of oxygen in your blood and helps you breathe easier. Activity  · Get regular exercise. Walking is an easy way to get exercise. Start out slowly, and walk a little more each day. · Pay attention to your breathing. You are exercising too hard if you can't talk while you exercise. · Take short rest breaks when doing household chores and other activities. · Learn breathing methods--such as breathing through pursed lips--to help you become less short of breath. · If your doctor has not set you up with a pulmonary rehabilitation program, ask if rehab is right for you. Rehab includes exercise programs, education about your disease and how to manage it, help with diet and other changes, and emotional support. Diet  · Eat regular, healthy meals. Use bronchodilators about 1 hour before you eat to make it easier to eat. Eat several small meals instead of three large ones.  Drink beverages at the end of the meal. Avoid foods that are hard to chew. · Eat foods that contain protein so you don't lose muscle mass. · Talk with your doctor if you gain too much weight or if you lose weight without trying. Mental health  · Talk to your family, friends, or a therapist about your feelings. Some people feel frightened, angry, hopeless, helpless, and even guilty. Talking openly about bad feelings can help you cope. If these feelings last, talk to your doctor. When should you call for help? [id number] anytime you think you may need emergency care. For example, call if:  · You have severe trouble breathing. Call your doctor now or seek immediate medical care if:  · You have new or worse trouble breathing. · You cough up blood. · You have a fever. Watch closely for changes in your health, and be sure to contact your doctor if:  · You cough more deeply or more often, especially if you notice more mucus or a change in the color of your mucus. · You have new or worse swelling in your legs or belly. · You are not getting better as expected. Where can you learn more? Go to http://jo-trudy.info/  Enter M715 in the search box to learn more about \"Chronic Obstructive Pulmonary Disease (COPD): Care Instructions. \"  Current as of: February 24, 2020               Content Version: 12.5  © 6409-8746 [employer]. Care instructions adapted under license by [employer] (which disclaims liability or warranty for this information). If you have questions about a medical condition or this instruction, always ask your healthcare professional. [place] [address] any warranty or liability for your use of this information.

## 2020-06-05 NOTE — ED NOTES
I have reviewed discharge instructions with the patient. The patient verbalized understanding. Patient left ED via Discharge Method: wheelchair to Home with spouse. Opportunity for questions and clarification provided. Patient given 2 scripts. To continue your aftercare when you leave the hospital, you may receive an automated call from our care team to check in on how you are doing. This is a free service and part of our promise to provide the best care and service to meet your aftercare needs.  If you have questions, or wish to unsubscribe from this service please call [phone]. Thank you for Choosing our [facility] Emergency Department.

## 2020-06-05 NOTE — ED NOTES
Pt requested iv to be taken out. This rn educated pt that if iv is removed and she gets admitted or needs more medications that she will need another iv placed. Pt still requested iv be removed.

## 2020-06-09 LAB
BACTERIA SPEC CULT: NORMAL
BACTERIA SPEC CULT: NORMAL
SERVICE CMNT-IMP: NORMAL
SERVICE CMNT-IMP: NORMAL

## 2020-06-12 ENCOUNTER — HOSPITAL ENCOUNTER (OUTPATIENT)
Dept: GENERAL RADIOLOGY | Age: 85
Discharge: HOME | End: 2020-06-12
Payer: MEDICARE

## 2020-06-12 DIAGNOSIS — R06.02 SOB (SHORTNESS OF BREATH): ICD-10-CM

## 2020-06-12 PROCEDURE — 71046 X-RAY EXAM CHEST 2 VIEWS: CPT

## 2020-07-08 ENCOUNTER — APPOINTMENT (OUTPATIENT)
Dept: RADIATION ONCOLOGY | Age: 85
End: 2020-07-08

## 2020-07-10 ENCOUNTER — HOSPITAL ENCOUNTER (OUTPATIENT)
Dept: CT IMAGING | Age: 85
Discharge: HOME | End: 2020-07-10
Attending: NURSE PRACTITIONER

## 2020-07-10 DIAGNOSIS — C34.11 MALIGNANT NEOPLASM OF UPPER LOBE OF RIGHT LUNG (HCC): ICD-10-CM

## 2020-07-15 ENCOUNTER — HOSPITAL ENCOUNTER (OUTPATIENT)
Dept: RADIATION ONCOLOGY | Age: 85
Discharge: HOME | End: 2020-07-15
Payer: MEDICARE

## 2020-07-15 VITALS
OXYGEN SATURATION: 96 % | SYSTOLIC BLOOD PRESSURE: 156 MMHG | DIASTOLIC BLOOD PRESSURE: 79 MMHG | RESPIRATION RATE: 18 BRPM | BODY MASS INDEX: 34.41 KG/M2 | TEMPERATURE: 97.2 F | HEART RATE: 136 BPM | WEIGHT: 219.7 LBS

## 2020-07-15 DIAGNOSIS — C34.11 MALIGNANT NEOPLASM OF UPPER LOBE OF RIGHT LUNG (HCC): Primary | ICD-10-CM

## 2020-07-15 PROCEDURE — 99211 OFF/OP EST MAY X REQ PHY/QHP: CPT

## 2020-07-15 NOTE — PROGRESS NOTES
2 Month Follow Up    RUL, RLL with Adeno RT End: 05/01/2020    SRS Fxs  SCC (No Chemo)    CT Chest (07/10/2020): mass smaller, possible pneumonitis, smaller RLL nodule    Patient is followed by pulmonary Michelle Guerra)  -Follow up Appt: 08/07/2020    Followed by Med Onc at [facility]    History of Colon Cancer  -treated with Folfox (2010) - Neuropathy    Patient is on oxygen (3 Liters)        Loe Slocumb, CMA

## 2020-07-15 NOTE — PROGRESS NOTES
Patient: Mary Kellogg MRN: [id number]  SSN: [id number]    YOB: 1933  Age: 80 y.o. Sex: female      Other Providers:  Dr. 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 female who has completed treatment for two synchronous NSCLC primaries. She had a CT scan, which I reviewed, which shows continued response. I see no sign of radiation pneumonitis or distant mets. Unfortunately, she continues to suffer from dyspnea. She's now on oxygen with minimal improvement. She has also been placed on a steroid taper with no improvement in her symptoms. She follows with her pulmonologist, Dr. Armando Church who is managing her medications. UPDATED PAST MEDICAL HISTORY:  Since our prior encounter, Mary Kellogg has not been hospitalized. There have been no significant changes to the medical history. MEDICATIONS:     Current Outpatient Medications:     predniSONE (DELTASONE) 20 mg tablet, 40 mg daily x 3 days, 30 mg daily x 3 days, 20 mg daily x 3 days, 10 mg daily x 3 days, or as directed. (Patient taking differently: 40 mg daily x 14 days, 30 mg daily x 14 days), Disp: 15 Tab, Rfl: 0    folic acid/multivit-min/lutein (CENTRUM SILVER PO), Take  by mouth., Disp: , Rfl:     guaiFENesin ER (Mucinex) 600 mg ER tablet, Take 600 mg by mouth two (2) times a day., Disp: , Rfl:     budesonide (Pulmicort) 0.5 mg/2 mL nbsp, 2 mL by Nebulization route two (2) times a day. DX: J44.9 (COPD), Disp: 60 Each, Rfl: 11    LORazepam (ATIVAN) 0.5 mg tablet, Take 1 Tab by mouth daily. Max Daily Amount: 0.5 mg. Take 1 hour prior to procedure daily - do not drive, Disp: 8 Tab, Rfl: 0    promethazine (PHENERGAN) 25 mg tablet, Take 25 mg by mouth every eight (8) hours as needed. , Disp: , Rfl:     HYDROcodone-acetaminophen (NORCO)  mg tablet, Take 1 Tab by mouth every four (4) hours as needed. , Disp: , Rfl:     albuterol-ipratropium (DUO-NEB) 2.5 mg-0.5 mg/3 ml nebu, Take 3 mL by inhalation every six (6) hours as needed for Wheezing., Disp: , Rfl:     Oxygen, 2.5-3 lpm cont  Patient use at hs and with moving, Disp: , Rfl:     ACETAMINOPHEN/DP-HYDRAM HCL (TYLENOL PM EXTRA STRENGTH PO), take 2 Tabs by mouth nightly., Disp: , Rfl:     doxycycline (VIBRA-TABS) 100 mg tablet, Take 1 Tab by mouth two (2) times a day., Disp: 14 Tab, Rfl: 0    CALCIUM CARBONATE/VITAMIN D3 (CALTRATE-600 PLUS VITAMIN D3 PO), Take  by mouth two (2) times a day., Disp: , Rfl:     ALLERGIES:   Allergies   Allergen Reactions    Pcn [Penicillins] Rash, Itching and Swelling    Tetanus Vaccines And Toxoid Unknown (comments)     Unknown allergy      Levaquin [Levofloxacin] Itching     Itching at site of infusion only at start of infusion, pt states very uncomfortable       PHYSICAL EXAMINATION:   ECOG Performance status 2  VITAL SIGNS:   Visit Vitals  /79 (BP 1 Location: Left arm, BP Patient Position: Sitting)   Pulse (!) 136   Temp 97.2 °F (36.2 °C)   Resp 18   Wt 99.7 kg (219 lb 11.2 oz)   SpO2 96%   BMI 34.41 kg/m²      General: well developed/nourished adult in no acute distress; appears stated age  [de-identified]: normocephalic, atraumatic; MMM, EOMI   Neck: supple with full ROM  Respiratory: normal inspiratory effort, no audible wheezes   Extremities: no cyanosis, clubbing, or edema   Musculoskeletal: mobility intact x4; normal ROM in all joints   Skin: no skin lesions identified   Neuro: AOx3; sensation intact x 4; CNII-XII grossly intact   Psych: appropriate affect, insight, and judgement       LABORATORY:   Lab Results   Component Value Date/Time    Sodium 140 06/04/2020 03:56 PM    Potassium 4.2 06/04/2020 03:56 PM    Chloride 105 06/04/2020 03:56 PM    CO2 27 06/04/2020 03:56 PM    Anion gap 8 06/04/2020 03:56 PM    Glucose 135 (H) 06/04/2020 03:56 PM    BUN 19 06/04/2020 03:56 PM    Creatinine 1.05 (H) 06/04/2020 03:56 PM    GFR est AA >60 06/04/2020 03:56 PM    GFR est non-AA 53 (L) 06/04/2020 03:56 PM    Calcium 9.4 06/04/2020 03:56 PM    Magnesium 2.0 10/21/2009 08:45 AM    Albumin 3.4 06/04/2020 03:56 PM    Protein, total 7.6 06/04/2020 03:56 PM    Globulin 4.2 (H) 06/04/2020 03:56 PM    A-G Ratio 0.8 (L) 06/04/2020 03:56 PM    ALT (SGPT) 25 06/04/2020 03:56 PM     Lab Results   Component Value Date/Time    WBC 9.0 06/04/2020 03:56 PM    HGB 12.9 06/04/2020 03:56 PM    HCT 41.3 06/04/2020 03:56 PM    PLATELET 693 50/31/3308 03:56 PM       RADIOLOGY:  Xr Chest Pa Lat    Result Date: 6/12/2020  CHEST X-RAY, 2 views. HISTORY:  Shortness breath. TECHNIQUE: PA and lateral views. COMPARISON: For June 2020. FINDINGS: -Lungs: Cavitary mass right apex. No new infiltrates. . -Costophrenic angles: are sharp. -Heart size: is normal. -Pulmonary vasculature: is unremarkable. -Included portion of the upper abdomen: is unremarkable. -Bones: No gross bony lesions. -Other: None. IMPRESSION: Stable exam with cavitary mass right apex. Xr Chest Pa Lat    Result Date: 6/4/2020  History: SOB Two views chest COMPARISON: 5/6/2020 Findings: There is a cavitary mass in the right upper lobe, as was seen previously. No new alveolar infiltrate or pleural effusion. The cardiac silhouette, and mediastinal contour, and osseous structures are stable. Impression: Stable exam with persistent cavitary mass in the right upper lobe, compatible with the patient's known history of lung cancer. Xr Chest Pa Lat    Result Date: 5/6/2020  CHEST X-RAY, 2 views. HISTORY:  COPD and lung malignancy. TECHNIQUE: PA and lateral views. COMPARISON: PET/CT of February 2020. FINDINGS: -Lungs: Demonstrates large cavitary lesion right upper lung zone. Otherwise lungs are hyperinflated and grossly clear. . -Heart size: Borderline. -Costophrenic angles: are sharp. -Pulmonary vasculature: is unremarkable.  -Included portion of the upper abdomen: is unremarkable. -Bones: No gross bony lesions. -Other: None. IMPRESSION: Large cavitary lesion right lung apex. No acute abnormality. Ct Chest Wo Cont    Result Date: 7/10/2020  History: Right upper lung cancer status post radiation therapy EXAM: CT chest without contrast TECHNIQUE: Thin section axial CT images are obtained from the thoracic inlet through the upper abdomen. Radiation dose reduction techniques were used for this study. Our CT scanners use one or all of the following: Automated exposure control, adjustment of the mA and/or kV according to patient size, use of iterative reconstruction. COMPARISON: 6/3/2020 FINDINGS: There is a cavitary mass in the right upper lobe measuring 4.1 x 3.6 cm (series 2, image 16). This is smaller than seen on the prior exam (previously measuring 4.8 x 4.8 cm). There is peripheral fibrotic change within the right lower lobe, similar to the findings seen previously with a more nodular focus seen peripherally in the right lower lobe measuring 2.3 x 1.3 cm, slightly smaller than seen on the prior exam. The central airways are patent. No mediastinal, hilar, or axillary lymphadenopathy present. Evaluation of the upper abdomen demonstrates a gallstone. Bone window evaluation demonstrates no aggressive osseous lesions. The right fourth rib fracture discussed on the prior exam is again noted. IMPRESSION: 1. Smaller cavitary mass in the right upper lobe than seen previously. 2. Peripheral fibrotic change of the right lower lobe, similar to the findings seen previously, which could be related to prior therapy, radiation pneumonitis. The more nodular peripheral lesion seen in the right lower lobe is slightly smaller than seen previously. Continued attention to this abnormality recommended on follow-up exams. 3. No additional interval change.     Ct Chest W Cont    Result Date: 5/8/2020  CHEST CT ANGIOGRAPHY WITH ADDITIONAL REFORMATS:  CLINICAL HISTORY: Shortness of breath and hemoptysis with clinical concern for pulmonary embolism in a patient with right upper lobe squamous cell carcinoma and right lower lobe adenocarcinoma. TECHNIQUE:  During bolus injection of nonionic intravenous contrast, the chest was scanned with spiral technique, and coronal reformats were produced. Radiation dose reduction was achieved using one or all of the following techniques: automated exposure control, weight-based dosing, iterative reconstruction. COMPARISON:  PET/CT of February 18, 2020. FINDINGS: There was suboptimal opacification of the pulmonary arterial tree with similar contrast in the left heart and aorta. However there is expected opacification of the Central pulmonary arterial tree with no definite intraluminal soft tissue density to confirm acute pulmonary embolism, accounting for respiratory motion artifact. There is no definite pneumothorax. The cavitary posterior right upper lobe mass which was markedly hypermetabolic by PET is larger today, measuring 6.1 x 4.9 cm compared with 4.9 x 4.6 cm in February. The left hypermetabolic posterior right lower lobe nodule is stable at 2.5 x 1.5 cm. No pathologically enlarged lymph nodes. There is now very small right pleural effusion. The epigastrium appears unremarkable as imaged. IMPRESSION:  1. NO DEFINITE ACUTE PULMONARY EMBOLISM IDENTIFIED ON THIS TECHNICALLY LIMITED EXAMINATION. 2.  INTERVAL INCREASE IN SIZE OF THE CAVITARY RIGHT UPPER LOBE SQUAMOUS CELL CARCINOMA SINCE FEBRUARY WITHOUT SIGNIFICANT CHANGE IN SIZE OF OF THE SMALLER, LESS HYPERMETABOLIC RIGHT LOWER LOBE ADENOCARCINOMA. TUMOR STATUS:  Continues to shrink    IMPRESSION:  Rosemary Moeller is a 80 y.o. female with two synchronous NSCLC of the RUL & RLL s/p definitive RT. PLAN:  I have recommended she come back in 3 mos with repeat CT.   In meantime, I have recommended she continue f/u with Dr. Armando Church to manage her COPD as she has no sign of radiation pneumonitis. Typically, if radiation-induced pneumonitis were to occur it would happen around 9 mos post treatment.     Kathryn Chase MD  07/15/20

## 2020-10-15 ENCOUNTER — APPOINTMENT (OUTPATIENT)
Dept: RADIATION ONCOLOGY | Age: 85
End: 2020-10-15

## (undated) DEVICE — KENDALL RADIOLUCENT FOAM MONITORING ELECTRODE RECTANGULAR SHAPE: Brand: KENDALL

## (undated) DEVICE — BRUSH CYTO L120MM DIA1.7MM SHARPENED NDL TIP FWD FACING

## (undated) DEVICE — PATCH SENS PT FOR ELECTROMAGNETIC NAVIGATION BRONCHSCP SYS

## (undated) DEVICE — Device: Brand: BALLOON

## (undated) DEVICE — CONNECTOR VENT EL DBL SWVL 15M 22M 15F

## (undated) DEVICE — FORCEPS BX WRK L110MM CHN L2MM DIA1.7MM SUPERDIMENSION

## (undated) DEVICE — Z DUP USE 2381765 CATHETER DIAG 180DEG FIRM TIP EWC EDGE 180 [SDK3000OLYMPUS] [SUPERDIMENSION INC]

## (undated) DEVICE — SINGLE USE BIOPSY VALVE MAJ-210: Brand: SINGLE USE BIOPSY VALVE (STERILE)

## (undated) DEVICE — (D)SYR 10ML 1/5ML GRAD NSAF -- PKGING CHANGE USE ITEM 338027

## (undated) DEVICE — SINGLE USE ASPIRATION NEEDLE: Brand: SINGLE USE ASPIRATION NEEDLE

## (undated) DEVICE — SINGLE USE SUCTION VALVE MAJ-209: Brand: SINGLE USE SUCTION VALVE (STERILE)

## (undated) DEVICE — BRONCHOSCOPY PACK: Brand: MEDLINE INDUSTRIES, INC.

## (undated) DEVICE — SOL INJ SOD CL0.9% 10ML PREFIL --

## (undated) DEVICE — SET EXTN L6IN W/ S STL CLMP